# Patient Record
Sex: FEMALE | Race: WHITE
[De-identification: names, ages, dates, MRNs, and addresses within clinical notes are randomized per-mention and may not be internally consistent; named-entity substitution may affect disease eponyms.]

---

## 2018-05-18 ENCOUNTER — HOSPITAL ENCOUNTER (EMERGENCY)
Dept: HOSPITAL 97 - ER | Age: 53
Discharge: HOME | End: 2018-05-18
Payer: COMMERCIAL

## 2018-05-18 DIAGNOSIS — Z88.5: ICD-10-CM

## 2018-05-18 DIAGNOSIS — Z87.820: ICD-10-CM

## 2018-05-18 DIAGNOSIS — R51: Primary | ICD-10-CM

## 2018-05-18 LAB
BUN BLD-MCNC: 16 MG/DL (ref 6–20)
GLUCOSE SERPLBLD-MCNC: 94 MG/DL (ref 65–120)
HCT VFR BLD CALC: 43.5 % (ref 36–45)
LYMPHOCYTES # SPEC AUTO: 1.5 K/UL (ref 0.7–4.9)
MCH RBC QN AUTO: 27.9 PG (ref 27–35)
MCV RBC: 85.3 FL (ref 80–100)
PMV BLD: 7.5 FL (ref 7.6–11.3)
POTASSIUM SERPL-SCNC: 4.1 MEQ/L (ref 3.6–5)
RBC # BLD: 5.11 M/UL (ref 3.86–4.86)

## 2018-05-18 PROCEDURE — 70496 CT ANGIOGRAPHY HEAD: CPT

## 2018-05-18 PROCEDURE — 80048 BASIC METABOLIC PNL TOTAL CA: CPT

## 2018-05-18 PROCEDURE — 70450 CT HEAD/BRAIN W/O DYE: CPT

## 2018-05-18 PROCEDURE — 99284 EMERGENCY DEPT VISIT MOD MDM: CPT

## 2018-05-18 PROCEDURE — 36415 COLL VENOUS BLD VENIPUNCTURE: CPT

## 2018-05-18 PROCEDURE — 72125 CT NECK SPINE W/O DYE: CPT

## 2018-05-18 PROCEDURE — 85025 COMPLETE CBC W/AUTO DIFF WBC: CPT

## 2018-05-18 PROCEDURE — 81003 URINALYSIS AUTO W/O SCOPE: CPT

## 2018-05-18 NOTE — RAD REPORT
EXAM DESCRIPTION:  CT - [id number] - 5/18/2018 10:27 am

 

CLINICAL HISTORY:  Trauma, head and neck injury.

 

COMPARISON:  None.

 

TECHNIQUE:  Axial 5 mm thick images of the head were obtained.

 

Axial 2 mm thick images of the cervical spine were obtained with sagittal and coronal reconstruction 
images generated and reviewed.

 

All CT scans are performed using dose optimization technique as appropriate and may include automated
 exposure control or mA/KV adjustment according to patient size.

 

FINDINGS:  CT HEAD WITHOUT CONTRAST:

 

No acute hemorrhage, hydrocephalus or extra-axial collection is identified.No areas of brain edema or
 midline shift.

 

The paranasal sinuses and mastoids are clear.The calvarium is intact.

 

CT CERVICAL SPINE WITHOUT CONTRAST:

 

No fracture or subluxation.Mild lower cervical spondylosis.No prevertebral soft tissues swelling is i
dentified.

 

IMPRESSION:  No acute intracranial or cervical spine findings.

## 2018-05-18 NOTE — ER
Nurse's Notes                                                                                     

 [facility]                                                                

Name: Caitie Nj                                                                                

Age: 52 yrs                                                                                       

Sex: Female                                                                                       

: 1965                                                                                   

MRN: [id number]                                                                                   

Arrival Date: 2018                                                                          

Time: 09:39                                                                                       

Account#: [id number]                                                                            

Bed 17                                                                                            

Private MD:                                                                                       

Diagnosis: Headache                                                                               

                                                                                                  

Presentation:                                                                                     

                                                                                             

09:56 Presenting complaint: Patient states: "I had a traumatic brain injury back in  and  aa5 

      I had bleeding in the brain but this Wednesday I hit my head with a metal thing and I       

      am worried about bleeding again". Pt denies LOC. Pt c/o head and neck pain. Transition      

      of care: patient was not received from another setting of care. Onset of symptoms was       

      May 2018. Initial Sepsis Screen: Does the patient meet any 2 criteria? No. Patient's        

      initial sepsis screen is negative. Does the patient have a suspected source of              

      infection? No. Patient's initial sepsis screen is negative. Care prior to arrival: None.    

09:56 Method Of Arrival: Ambulatory                                                           aa 

09:56 Acuity: SANTOSH 4                                                                           aa5 

                                                                                                  

OB/GYN:                                                                                           

10:00 LMP 2018                                                                              aa5 

                                                                                                  

Historical:                                                                                       

- Allergies:                                                                                      

10:00 Demerol;                                                                                aa5 

- Home Meds:                                                                                      

10:00 hydrocodone-acetaminophen 7.5-325 mg Oral tab every 4-6 hours [Active]; Cyclobenzaprine aa5 

      Oral [Active]; Zoloft Oral [Active]; Trazodone Oral [Active];                               

- PMHx:                                                                                           

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 

- PSHx:                                                                                           

10:00 back; jesi knees; Appendectomy; Cholecystectomy;                                         aa5 

                                                                                                  

- Immunization history:: Adult Immunizations up to date.                                          

- Social history:: Smoking status: Patient/guardian denies using tobacco.                         

                                                                                                  

                                                                                                  

Screenin:20 Abuse screen: Denies threats or abuse. Denies injuries from another. Nutritional        hb  

      screening: No deficits noted. Tuberculosis screening: No symptoms or risk factors           

      identified. Fall Risk None identified.                                                      

                                                                                                  

Assessment:                                                                                       

11:20 General: Appears in no apparent distress. Behavior is calm, cooperative. Pain: Pain     hb  

      currently is 3 out of 10 on a pain scale. Neuro: Level of Consciousness is awake,           

      alert, obeys commands, Oriented to person, place, time, situation. Cardiovascular:          

      Capillary refill < 3 seconds Patient's skin is warm and dry. Respiratory: Airway is         

      patent Trachea midline Respiratory effort is even, unlabored, Respiratory pattern is        

      regular, symmetrical. GI: No signs and/or symptoms were reported involving the              

      gastrointestinal system. : No signs and/or symptoms were reported regarding the           

      genitourinary system. EENT: No signs and/or symptoms were reported regarding the EENT       

      system. Derm: No signs and/or symptoms reported regarding the dermatologic system. Skin     

      is pink, warm \T\ dry. Musculoskeletal: No signs and/or symptoms reported regarding the     

      musculoskeletal system.                                                                     

12:00 Reassessment: Patient appears in no apparent distress at this time. No changes from     hb  

      previously documented assessment. Patient and/or family updated on plan of care and         

      expected duration. Pain level reassessed. Patient is alert, oriented x 3, equal             

      unlabored respirations, skin warm/dry/pink.                                                 

13:00 Reassessment: Patient appears in no apparent distress at this time. No changes from     hb  

      previously documented assessment. Patient and/or family updated on plan of care and         

      expected duration. Pain level reassessed. Patient is alert, oriented x 3, equal             

      unlabored respirations, skin warm/dry/pink.                                                 

                                                                                                  

Vital Signs:                                                                                      

10:00  / 100; Pulse 88; Resp 18 S; Temp 98.2(TE); Pulse Ox 100% on R/A; Weight 68.95 kg aa5 

      (R); Height 5 ft. 4 in. (162.56 cm) (R); Pain 7/10;                                         

10:47  / 95; Pulse 78; Resp 16; Pulse Ox 100% ;                                         mh5 

11:30  / 100; Pulse 69; Resp 16; Pulse Ox 100% on R/A;                                  mh5 

12:22  / 98; Pulse 79; Resp 15; Pulse Ox 97% on R/A;                                    mh5 

10:00 Body Mass Index 26.09 (68.95 kg, 162.56 cm)                                             aa5 

                                                                                                  

Dayanara Coma Score:                                                                               

11:26 Eye Response: spontaneous(4). Verbal Response: oriented(5). Motor Response: obeys       snw 

      commands(6). Total: 15.                                                                     

13:01 Eye Response: spontaneous(4). Verbal Response: oriented(5). Motor Response: obeys       snw 

      commands(6). Total: 15.                                                                     

                                                                                                  

ED Course:                                                                                        

09:39 Patient arrived in ED.                                                                  sb2 

09:58 Triage completed.                                                                       aa5 

09:58 Arm band placed on.                                                                     aa5 

10:26 CT completed. Patient tolerated procedure well. Patient moved to CT via wheelchair.     sj  

      Patient moved back from CT.                                                                 

10:27 CT Head C Spine In Process Unspecified.                                                 EDMS

10:32 [facility]                                                        snw 

10:32 Alejandro Lynch MD is Attending Physician.                                              snw 

10:37 Sandhya Gant, RN is Primary Nurse.                                                   hb  

11:20 Patient has correct armband on for positive identification. Bed in low position. Call   hb  

      light in reach. Side rails up X 1.                                                          

11:33 Inserted saline lock: 20 gauge in right antecubital area, using aseptic technique.      hb  

      Blood collected.                                                                            

12:05 Head angio In Process Unspecified.                                                      EDMS

12:05 CT completed. Patient tolerated procedure well. Patient moved to CT via wheelchair.     sj  

      Patient moved to CT Patient moved back from CT.                                             

13:01 No provider procedures requiring assistance completed. IV discontinued, intact,         hb  

      bleeding controlled, No redness/swelling at site. Pressure dressing applied.                

                                                                                                  

Administered Medications:                                                                         

No medications were administered                                                                  

                                                                                                  

                                                                                                  

Outcome:                                                                                          

12:47 Discharge ordered by MD.                                                                snw 

13:01 Discharged to home ambulatory, with significant other.                                  hb  

13:01 Condition: stable                                                                           

13:01 Discharge instructions given to patient, Instructed on discharge instructions, follow       

      up and referral plans. medication usage, Demonstrated understanding of instructions,        

      follow-up care, medications, Prescriptions given X 2.                                       

13:01 Patient left the ED.                                                                    hb  

                                                                                                  

Signatures:                                                                                       

Dispatcher [employer]                           EDMS                                                 

Faith Simmons FNP-C FNP-Ingrid                                                  

Yolanda Souza Audri, RN RN   [facility]                                                  

Sandhya Gant RN RN hb Martinez, Maria                              [facility]                                                  

Sherice Escalona                               2                                                  

                                                                                                  

Corrections: (The following items were deleted from the chart)                                    

10: 09:56 Presenting complaint: Patient states: "I had a traumatic brain injury back in     2009 and I had bleeding in the brain but this Wednesday I hit my head with a metal          

      thing and I am worried about bleeding again". Pt denies LOC. [facility]                            

10: 09:56 Acuity: SANTOSH 3 aa5                                                                 aa5 

                                                                                                  

**************************************************************************************************

## 2018-05-18 NOTE — EDPHYS
Physician Documentation                                                                           

 [facility]                                                                

Name: Caitie Nj                                                                                

Age: 52 yrs                                                                                       

Sex: Female                                                                                       

: 1965                                                                                   

MRN: [id number]                                                                                   

Arrival Date: 2018                                                                          

Time: 09:39                                                                                       

Account#: [id number]                                                                            

Bed 17                                                                                            

Private MD:                                                                                       

ED Physician Alejandro Lynch                                                                       

HPI:                                                                                              

                                                                                             

11:26 This 52 yrs old  Female presents to ER via Ambulatory with complaints of PAIN  snw 

      FROM PREVIOUS HEAD INJURY.                                                                  

11:26 The patient or guardian reports pain, tenderness. The complaints affect the left side   snw 

      of the back of head. Context of injury: resulted from a direct blow, a heavy object.        

      Onset: The symptoms/episode began/occurred gradually, and became persistent. Associated     

      signs and symptoms: Loss of consciousness: This patient did not experience any loss of      

      consciousness. Pertinent positives: water flow sensation to left occipital area post a      

      popping sensation. Severity of symptoms: At their worst the symptoms were moderate. The     

      patient has not experienced similar symptoms in the past. The patient has not recently      

      seen a physician. TBI .                                                                 

                                                                                                  

OB/GYN:                                                                                           

10:00 LMP 2018                                                                              aa5 

                                                                                                  

Historical:                                                                                       

- Allergies:                                                                                      

10:00 Demerol;                                                                                aa5 

- Home Meds:                                                                                      

10:00 hydrocodone-acetaminophen 7.5-325 mg Oral tab every 4-6 hours [Active]; Cyclobenzaprine aa5 

      Oral [Active]; Zoloft Oral [Active]; Trazodone Oral [Active];                               

- PMHx:                                                                                           

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 

- PSHx:                                                                                           

10:00 back; jesi knees; Appendectomy; Cholecystectomy;                                         aa5 

                                                                                                  

- Immunization history:: Adult Immunizations up to date.                                          

- Social history:: Smoking status: Patient/guardian denies using tobacco.                         

                                                                                                  

                                                                                                  

ROS:                                                                                              

11:23 Constitutional: Negative for fever, chills, and weight loss, Eyes: Negative for injury, snw 

      pain, redness, and discharge, ENT: Negative for injury, pain, and discharge, Neck:          

      Negative for injury, pain, and swelling, Cardiovascular: Negative for chest pain,           

      palpitations, and edema, Respiratory: Negative for shortness of breath, cough,              

      wheezing, and pleuritic chest pain, Abdomen/GI: Negative for abdominal pain, nausea,        

      vomiting, diarrhea, and constipation, Back: Negative for injury and pain, : Negative      

      for injury, bleeding, discharge, and swelling, MS/Extremity: Negative for injury and        

      deformity, Skin: Negative for injury, rash, and discoloration.                              

11:23 Neuro: Positive for headache, pt states she feels a running water sensation at              

      intervals to left occipital area.                                                           

                                                                                                  

Exam:                                                                                             

11:23 Constitutional:  This is a well developed, well nourished patient who is awake, alert,  snw 

      and in no acute distress. Head/Face:  Normocephalic, atraumatic. Eyes:  Pupils equal        

      round and reactive to light, extra-ocular motions intact.  Lids and lashes normal.          

      Conjunctiva and sclera are non-icteric and not injected.  Cornea within normal limits.      

      Periorbital areas with no swelling, redness, or edema. ENT:  Nares patent. No nasal         

      discharge, no septal abnormalities noted.  Tympanic membranes are normal and external       

      auditory canals are clear.  Oropharynx with no redness, swelling, or masses, exudates,      

      or evidence of obstruction, uvula midline.  Mucous membranes moist. Neck:  Trachea          

      midline, no thyromegaly or masses palpated, and no cervical lymphadenopathy.  Supple,       

      full range of motion without nuchal rigidity, or vertebral point tenderness.  No            

      Meningismus. Chest/axilla:  Normal chest wall appearance and motion.  Nontender with no     

      deformity.  No lesions are appreciated. Cardiovascular:  Regular rate and rhythm with a     

      normal S1 and S2.  No gallops, murmurs, or rubs.  Normal PMI, no JVD.  No pulse             

      deficits. Respiratory:  Lungs have equal breath sounds bilaterally, clear to                

      auscultation and percussion.  No rales, rhonchi or wheezes noted.  No increased work of     

      breathing, no retractions or nasal flaring. Abdomen/GI:  Soft, non-tender, with normal      

      bowel sounds.  No distension or tympany.  No guarding or rebound.  No evidence of           

      tenderness throughout. Back:  No spinal tenderness.  No costovertebral tenderness.          

      Full range of motion. Skin:  Warm, dry with normal turgor.  Normal color with no            

      rashes, no lesions, and no evidence of cellulitis. MS/ Extremity:  Pulses equal, no         

      cyanosis.  Neurovascular intact.  Full, normal range of motion. Neuro:  Awake and           

      alert, GCS 15, oriented to person, place, time, and situation.  Cranial nerves II-XII       

      grossly intact.  Motor strength 5/5 in all extremities.  Sensory grossly intact.            

      Cerebellar exam normal.  Normal gait.                                                       

                                                                                                  

Vital Signs:                                                                                      

10:00  / 100; Pulse 88; Resp 18 S; Temp 98.2(TE); Pulse Ox 100% on R/A; Weight 68.95 kg aa5 

      (R); Height 5 ft. 4 in. (162.56 cm) (R); Pain 7/10;                                         

10:47  / 95; Pulse 78; Resp 16; Pulse Ox 100% ;                                         mh5 

11:30  / 100; Pulse 69; Resp 16; Pulse Ox 100% on R/A;                                  mh5 

12:22  / 98; Pulse 79; Resp 15; Pulse Ox 97% on R/A;                                    mh5 

10:00 Body Mass Index 26.09 (68.95 kg, 162.56 cm)                                             aa5 

                                                                                                  

[place]anara Coma Score:                                                                               

11:26 Eye Response: spontaneous(4). Verbal Response: oriented(5). Motor Response: obeys       snw 

      commands(6). Total: 15.                                                                     

13:01 Eye Response: spontaneous(4). Verbal Response: oriented(5). Motor Response: obeys       snw 

      commands(6). Total: 15.                                                                     

                                                                                                  

MDM:                                                                                              

10:44 Patient medically screened.                                                             snw 

13:01 Data reviewed: vital signs, nurses notes. Data interpreted: Pulse oximetry: on room air snw 

      is 97 %. Interpretation: normal. Counseling: I had a detailed discussion with the           

      patient and/or guardian regarding: the historical points, exam findings, and any            

      diagnostic results supporting the discharge/admit diagnosis, the presence of at least       

      one elevated blood pressure reading (>120/80) during this emergency department visit,       

      the need for outpatient follow up, to return to the emergency department if symptoms        

      worsen or persist or if there are any questions or concerns that arise at home. Special     

      discussion: Based on the patient's history, exam and DX evaluation, there is no             

      indication for emergent intervention or inpatient TX. It is understood by the               

      patient/guardian that if the SXs persist or worsen they need to return immediately for      

      re-evaluation. Based on the history and exam findings, there is no indication for           

      further emergent testing or inpatient evaluation. I discussed with the patient/guardian     

      the need to see the primary care provider for further evaluation of the symptoms.           

                                                                                                  

                                                                                             

11:11 Order name: CBC with Diff; Complete Time: 11:46                                         snw 

                                                                                             

11:11 Order name: Chem 7; Complete Time: 11:57                                                [facility] 

                                                                                             

10:11 Order name: CT Head C Spine; Complete Time: 10:44                                       [facility] 

                                                                                             

11:12 Order name: Head angio; Complete Time: 12:46                                            [facility]

                                                                                             

12:56 Order name: Urine Dipstick--Ancillary (enter results)                                   bd  

                                                                                                  

Administered Medications:                                                                         

No medications were administered                                                                  

                                                                                                  

                                                                                                  

Disposition:                                                                                      

18:40 Co-signature as Attending Physician, Alejandro Lynch MD.                                    

                                                                                                  

Disposition:                                                                                      

18 12:47 Discharged to Home. Impression: Headache.                                          

- Condition is Stable.                                                                            

- Discharge Instructions: General Headache Without Cause, Hypertension, Rehydration,              

  Adult.                                                                                          

- Prescriptions for Fiorinal 50- 325-40 mg Oral Capsule - take 1 capsule by ORAL route            

  every 6 hours As needed - not to exceed 6 capsules per day; 10 capsule. orphenadrine            

  citrate 100 mg Oral Tablet Sustained Release - take 1 tablet by ORAL route 2 times              

  per day As needed; 20 tablet.                                                                   

- Medication Reconciliation Form, Thank You Letter, Antibiotic Education, Prescription            

  Opioid Use form.                                                                                

- Follow up: Private Physician; When: 2 - 3 days; Reason: Recheck today's complaints,             

  Continuance of care, Re-evaluation by your physician. Follow up: Emergency                      

  Department; When: As needed; Reason: Worsening of condition.                                    

                                                                                                  

                                                                                                  

                                                                                                  

Signatures:                                                                                       

Dispatcher [employer]                           [facility]                                                 

Faith Simmons, FNP-C                 FNP-Csnw                                                  

Rhianna Fontana RN                     RN   aa5                                                  

Sandhya Gant RN                     RN   hb                                                   

Alejandro Lynch MD MD                                                      

                                                                                                  

Corrections: (The following items were deleted from the chart)                                    

13:01 12:47 2018 12:47 Discharged to Home. Impression: Headache. Condition is Stable.   hb  

      Forms are Medication Reconciliation Form, Thank You Letter, Antibiotic Education,           

      Prescription Opioid Use. Follow up: Private Physician; When: 2 - 3 days; Reason:            

      Recheck today's complaints, Continuance of care, Re-evaluation by your physician.           

      Follow up: Emergency Department; When: As needed; Reason: Worsening of condition. snw       

                                                                                                  

**************************************************************************************************

## 2018-06-05 ENCOUNTER — HOSPITAL ENCOUNTER (EMERGENCY)
Dept: HOSPITAL 97 - ER | Age: 53
Discharge: HOME | End: 2018-06-05
Payer: SELF-PAY
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
## 2018-06-05 DIAGNOSIS — S93.601A: Primary | ICD-10-CM

## 2018-06-05 DIAGNOSIS — Z88.8: ICD-10-CM

## 2018-06-05 PROCEDURE — 99283 EMERGENCY DEPT VISIT LOW MDM: CPT

## 2018-06-05 NOTE — EDPHYS
Physician Documentation                                                                           

 [facility]                                                                

Name: Caitie Nj                                                                                

Age: 53 yrs                                                                                       

Sex: Female                                                                                       

: 1965                                                                                   

MRN: [id number]                                                                                   

Arrival Date: 2018                                                                          

Time: 03:26                                                                                       

Account#: [id number]                                                                            

Bed 14                                                                                            

Private MD: Aldo Matias ED Physician Chepe Leal                                                                     

HPI:                                                                                              

                                                                                             

06:56 This 53 yrs old  Female presents to ER via Wheelchair with complaints of Foot  tw4 

      Injury.                                                                                     

06:56 The patient presents with a contusion, an injury.                                       tw4 

06:57 The complaints affect the right foot. Context: The problem was sustained at home.       tw4 

      Modifying factors: The symptoms are alleviated by nothing, the symptoms are aggravated      

      by nothing. Associated signs and symptoms: The patient has no apparent associated signs     

      or symptoms. Severity of symptoms: At their worst the symptoms were. The patient has        

      not experienced similar symptoms in the past.                                               

                                                                                                  

OB/GYN:                                                                                           

03:33 LMP N/A - Post-menopause                                                                fc  

                                                                                                  

Historical:                                                                                       

- Allergies:                                                                                      

03:33 Demerol;                                                                                fc  

- Home Meds:                                                                                      

03:33 hydrocodone-acetaminophen 7.5-325 mg Oral tab every 4-6 hours [Active]; Zoloft 50 mg    fc  

      oral tab 1 tab nightly [Active]; cyclobenzaprine 10 mg oral tab as needed [Active];         

      duloxetine 30 mg 1 tab in am and mid day then 60 mg at night oral cpDR [Active];            

- PMHx:                                                                                           

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  

- PSHx:                                                                                           

03:33 Knee surgery; Cholecystectomy; Appendectomy; back surg; ectopic pregnancy;              fc  

                                                                                                  

- Immunization history:: Last tetanus immunization: up to date.                                   

- Social history:: Smoking status: Patient/guardian denies using tobacco.                         

- Ebola Screening: : Patient negative for fever greater than or equal to 101.5 degrees            

  Fahrenheit, and additional compatible Ebola Virus Disease symptoms Patient denies               

  exposure to infectious person Patient denies travel to an Ebola-affected area in the            

  21 days before illness onset.                                                                   

                                                                                                  

                                                                                                  

ROS:                                                                                              

06:57 MS/extremity: Positive for injury or acute deformity, decreased range of motion, pain.  tw4 

06:57 Cardiovascular: Negative for chest pain, palpitations, and edema, Respiratory: Negative     

      for shortness of breath, cough, wheezing, and pleuritic chest pain, Abdomen/GI:             

      Negative for abdominal pain, nausea, vomiting, diarrhea, and constipation, Back:            

      Negative for injury and pain.                                                               

06:57 MS/extremity: Positive for injury or acute deformity, contusion, decreased range of         

      motion, pain, Negative for                                                                  

                                                                                                  

Exam:                                                                                             

06:57 Constitutional:  This is a well developed, well nourished patient who is awake, alert,  tw4 

      and in no acute distress. Head/Face:  Normocephalic, atraumatic. Cardiovascular:            

      Regular rate and rhythm with a normal S1 and S2.  No gallops, murmurs, or rubs.  Normal     

      PMI, no JVD.  No pulse deficits. Respiratory:  Lungs have equal breath sounds               

      bilaterally, clear to auscultation and percussion.  No rales, rhonchi or wheezes noted.     

       No increased work of breathing, no retractions or nasal flaring.                           

06:57 Musculoskeletal/extremity: Extremities: noted in the dorsum of right foot: ROM: limited     

      active range of motion, limited passive range of motion, in the dorsum of right foot,       

      right second toe and Right first toenail.                                                   

                                                                                                  

Vital Signs:                                                                                      

03:33  / 97; Pulse 95; Resp 18; Temp 97.9(O); Pulse Ox 99% on R/A; Weight 68.49 kg (R); fc  

      Height 5 ft. 4 in. (162.56 cm) (R); Pain 8/10;                                              

03:33 Body Mass Index 25.92 (68.49 kg, 162.56 cm)                                             fc  

                                                                                                  

MDM:                                                                                              

03:47 Patient medically screened.                                                             tw4 

06:57 Data reviewed: vital signs, nurses notes. Data interpreted: Cardiac monitor: rhythm is  tw4 

      normal sinus rhythm, Pulse oximetry: Interpretation: normal. Counseling: I had a            

      detailed discussion with the patient and/or guardian regarding: the historical points,      

      exam findings, and any diagnostic results supporting the discharge/admit diagnosis.         

      Medication response: NORCO. Response to treatment: the patient's symptoms have markedly     

      improved after treatment, and as a result, I will discharge patient. Special                

      discussion: I discussed with the patient/guardian in detail that at this point there is     

      no indication for admission to the hospital. It is understood, however, that if the         

      symptoms persist or worsen the patient needs to return immediately for re-evaluation.       

                                                                                                  

                                                                                             

03:39 Order name: Foot Right 3 View XRAY; Complete Time: 07:59                                fc  

                                                                                                  

Administered Medications:                                                                         

05:00 Drug: Norco 5 mg-325 mg 1 tabs Route: PO;                                               aa1 

05:04 Follow up: Response: No adverse reaction; Medication administered at discharge.         aa1 

                                                                                                  

                                                                                                  

Disposition:                                                                                      

18 04:55 Discharged to Home. Impression: Other sprain of right foot.                        

- Condition is Stable.                                                                            

- Discharge Instructions: Foot Contusion, Foot Contusion, Easy-to-Read.                           

- Prescriptions for Ibuprofen 800 mg Oral Tablet - take 1 tablet by ORAL route every 8            

  hours As needed take with food; 30 tablet. Tylenol- Codeine #3 300-30 mg Oral Tablet            

  - take 2 tablet by ORAL route every 6 hours As needed; 6 tablet.                                

- Medication Reconciliation Form, Thank You Letter, Antibiotic Education, Prescription            

  Opioid Use form.                                                                                

- Follow up: Aldo Matias MD; When: As needed; Reason: If symptoms return, Recheck              

  today's complaints, Continuance of care, Re-evaluation by your physician.                       

- Problem is new.                                                                                 

- Symptoms have improved.                                                                         

                                                                                                  

                                                                                                  

                                                                                                  

Addendum:                                                                                         

2018                                                                                        

     08:11 Addendum: D/w patient and she indicated that in follow-up her PCP noted the fracture    k
dr

           and that she had been sent for an MRI. Current concern is for a possible Lis Franc     

           fracture. She was not given pain medication, splinting or wrap support. The patient is 

           a physical therapist. I indicated that we would defer the physician portion of the     

           bill. The conversation was cordial and she seemed to be satisfied with the call-back   

           and proposed outcome/remediation.                                                      

2018                                                                                        

     15:55 Addendum: Lennox avelar dr

                                                                                                  

Signatures:                                                                                       

Dispatcher [employer]                           [facility]                                                 

Christie Longoria RN                        RN   aa1                                                  

Cornelio Mensah MD MD   [facility]                                                  

Jennifer Salas RN                   RN                                                      

Chepe Leal MD MD   tw4                                                  

                                                                                                  

Corrections: (The following items were deleted from the chart)                                    

                                                                                             

05:09 04:55 2018 04:55 Discharged to Home. Impression: Other sprain of right foot.      aa1 

      Condition is Stable. Forms are Medication Reconciliation Form, Thank You Letter,            

      Antibiotic Education, Prescription Opioid Use. Follow up: Aldo Matias; When: As            

      needed; Reason: If symptoms return, Recheck today's complaints, Continuance of care,        

      Re-evaluation by your physician. Problem is new. Symptoms have improved. tw4                

                                                                                                  

**************************************************************************************************

## 2018-06-05 NOTE — ER
Nurse's Notes                                                                                     

 [facility]                                                                

Name: Caitie Nj                                                                                

Age: 53 yrs                                                                                       

Sex: Female                                                                                       

: 1965                                                                                   

MRN: [id number]                                                                                   

Arrival Date: 2018                                                                          

Time: 03:26                                                                                       

Account#: [id number]                                                                            

Bed 14                                                                                            

Private MD: Aldo Matias                                                                         

Diagnosis: Other sprain of right foot                                                             

                                                                                                  

Presentation:                                                                                     

                                                                                             

03:29 Presenting complaint: Patient states: that she tripped over the dog last night at 2030. fc  

      Now daisy is red and swollen, very tender to touch. Transition of care: patient was not      

      received from another setting of care. Onset of symptoms was 2018 at 20:30.        

      Risk Assessment: Do you want to hurt yourself or someone else? Patient reports no           

      desire to harm self or others. Initial Sepsis Screen: Does the patient meet any 2           

      criteria? HR > 90 bpm. Yes Does the patient have a suspected source of infection? No.       

      Patient's initial sepsis screen is negative. Care prior to arrival: Medication(s)           

      given: Hydrocodone at 2130.                                                                 

03:29 Method Of Arrival: Wheelchair                                                           fc  

03:29 Acuity: SANTOSH 4                                                                           fc  

                                                                                                  

OB/GYN:                                                                                           

03:33 LMP N/A - Post-menopause                                                                fc  

                                                                                                  

Historical:                                                                                       

- Allergies:                                                                                      

03:33 Demerol;                                                                                fc  

- Home Meds:                                                                                      

03:33 hydrocodone-acetaminophen 7.5-325 mg Oral tab every 4-6 hours [Active]; Zoloft 50 mg    fc  

      oral tab 1 tab nightly [Active]; cyclobenzaprine 10 mg oral tab as needed [Active];         

      duloxetine 30 mg 1 tab in am and mid day then 60 mg at night oral cpDR [Active];            

- PMHx:                                                                                           

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  

- PSHx:                                                                                           

03:33 Knee surgery; Cholecystectomy; Appendectomy; back surg; ectopic pregnancy;              fc  

                                                                                                  

- Immunization history:: Last tetanus immunization: up to date.                                   

- Social history:: Smoking status: Patient/guardian denies using tobacco.                         

- Ebola Screening: : Patient negative for fever greater than or equal to 101.5 degrees            

  Fahrenheit, and additional compatible Ebola Virus Disease symptoms Patient denies               

  exposure to infectious person Patient denies travel to an Ebola-affected area in the            

  21 days before illness onset.                                                                   

                                                                                                  

                                                                                                  

Screenin:35 Abuse screen: Denies threats or abuse. Nutritional screening: No deficits noted.          

      Tuberculosis screening: No symptoms or risk factors identified.                             

04:00 Fall Risk None identified.                                                              aa1 

                                                                                                  

Assessment:                                                                                       

04:00 General: Appears in no apparent distress. comfortable, Behavior is calm, cooperative,   aa1 

      appropriate for age. Pain: Complains of pain in dorsum of right foot. Neuro: Level of       

      Consciousness is awake, alert, obeys commands, Oriented to person, place, time,             

      situation. Respiratory: Airway is patent Respiratory effort is even, unlabored,             

      Respiratory pattern is regular, symmetrical. GI: No signs and/or symptoms were reported     

      involving the gastrointestinal system. : No signs and/or symptoms were reported           

      regarding the genitourinary system. EENT: No signs and/or symptoms were reported            

      regarding the EENT system. Derm: Skin is intact, is healthy with good turgor, Skin is       

      pink, warm \T\ dry. Musculoskeletal: Circulation, motion, and sensation intact. Capillary   

      refill < 3 seconds, Range of motion: intact in all extremities.                             

05:05 Reassessment: Patient appears in no apparent distress at this time. Patient is alert,   aa1 

      oriented x 3, equal unlabored respirations, skin warm/dry/pink. Discussed d/c \T\ f/u       

      instructions with pt; denies questions or concerns at this time.                            

                                                                                                  

Vital Signs:                                                                                      

03:33  / 97; Pulse 95; Resp 18; Temp 97.9(O); Pulse Ox 99% on R/A; Weight 68.49 kg (R); fc  

      Height 5 ft. 4 in. (162.56 cm) (R); Pain 8/10;                                              

03:33 Body Mass Index 25.92 (68.49 kg, 162.56 cm)                                               

                                                                                                  

ED Course:                                                                                        

03:26 Patient arrived in ED.                                                                  ds1 

03:26 Aldo Matais MD is Private Physician.                                                 ds1 

03:31 Triage completed.                                                                       fc  

03:33 Arm band placed on right wrist. Patient placed in an exam room, on a stretcher.         fc  

03:35 Patient has correct armband on for positive identification. Bed in low position. Call     

      light in reach.                                                                             

03:47 Chepe Leal MD is Attending Physician.                                            tw4 

04:00 X-ray completed. Portable x-ray completed in exam room. Patient tolerated procedure     kw  

      well.                                                                                       

04:01 Foot Right 3 View XRAY In Process Unspecified.                                          EDMS

04:55 Aldo Matias MD is Referral Physician.                                                tw4 

04:57 Christie Longoria, RN is Primary Nurse.                                                      aa1 

05:05 No provider procedures requiring assistance completed. Patient did not have IV access   aa1 

      during this emergency room visit.                                                           

                                                                                                  

Administered Medications:                                                                         

05:00 Drug: Norco 5 mg-325 mg 1 tabs Route: PO;                                               aa1 

05:04 Follow up: Response: No adverse reaction; Medication administered at discharge.         aa1 

                                                                                                  

                                                                                                  

Outcome:                                                                                          

04:55 Discharge ordered by MD.                                                                tw4 

05:05 Discharged to home via wheelchair, with significant other.                              aa1 

05:05 Condition: good                                                                             

05:05 Discharge instructions given to patient, significant other, Instructed on discharge         

      instructions, follow up and referral plans. medication usage, Demonstrated                  

      understanding of instructions, follow-up care, medications, Prescriptions given X 2.        

05:09 Patient left the ED.                                                                    aa1 

                                                                                                  

Signatures:                                                                                       

Dispatcher [employer]                           EDMS                                                 

Christie Longoria, ABDOULAYE STANFORD   aa1                                                  

Chretien, Jennifer, RN                   RN   fc                                                   

Sorto, Bela                                ds1                                                  

Keya Flores Terrence, MD MD   tw4                                                  

                                                                                                  

**************************************************************************************************

## 2018-06-05 NOTE — RAD REPORT
EXAM DESCRIPTION:  RAD - Foot Right 3 View - 6/5/2018 4:01 am

 

CLINICAL HISTORY:  Pain to foot

 

COMPARISON:  None.

 

FINDINGS:  Soft tissue swelling is seen along the dorsum of the foot at the level of the tarsal metat
arsal articulations. There is subtle lucency seen at the base of the second metatarsal medially this 
may be a insignificant, however a subtle fracture could also have this appearance. Advise followup MR
 imaging if pain persists or progresses to better assess this region.

## 2023-03-18 ENCOUNTER — HOSPITAL ENCOUNTER (EMERGENCY)
Dept: HOSPITAL 97 - ER | Age: 58
Discharge: HOME | End: 2023-03-18
Payer: COMMERCIAL

## 2023-03-18 VITALS — TEMPERATURE: 98.2 F | DIASTOLIC BLOOD PRESSURE: 58 MMHG | SYSTOLIC BLOOD PRESSURE: 99 MMHG

## 2023-03-18 VITALS — OXYGEN SATURATION: 95 %

## 2023-03-18 DIAGNOSIS — Z20.822: ICD-10-CM

## 2023-03-18 DIAGNOSIS — Z88.5: ICD-10-CM

## 2023-03-18 DIAGNOSIS — L08.9: ICD-10-CM

## 2023-03-18 DIAGNOSIS — J02.0: Primary | ICD-10-CM

## 2023-03-18 LAB — SARS-COV-2 RNA RESP QL NAA+PROBE: NEGATIVE

## 2023-03-18 PROCEDURE — 0240U: CPT

## 2023-03-18 PROCEDURE — 87081 CULTURE SCREEN ONLY: CPT

## 2023-03-18 PROCEDURE — 99283 EMERGENCY DEPT VISIT LOW MDM: CPT

## 2023-03-18 NOTE — EDPHYS
Physician Documentation                                                                           

 [facility]                                                                 

Name: Caitie Nj                                                                                

Age: 57 yrs                                                                                       

Sex: Female                                                                                       

: 1965                                                                                   

MRN: [id number]                                                                                   

Arrival Date: 2023                                                                          

Time: 13:42                                                                                       

Account#: [id number]                                                                            

Bed 12                                                                                            

Private MD: Aldo Matias                                                                         

ED Physician Hemanth Almanzar                                                                       

Historical:                                                                                       

- Allergies:                                                                                      

                                                                                             

13:52 Demerol;                                                                                ll1 

- PMHx:                                                                                           

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 

      Hypertensive disorder;                                                                      

- PSHx:                                                                                           

13:52 hip replacement; neurostimulator; Appendectomy;                                         ll1 

                                                                                                  

- Immunization history:: Client reports receiving the 2nd dose of the Covid vaccine.              

- Social history:: Smoking status: Patient denies any tobacco usage or history of.                

                                                                                                  

                                                                                                  

Vital Signs:                                                                                      

13:50  / 96; Pulse 102; Resp 17; Temp 99.8; Pulse Ox 95% on R/A; Weight 76.66 kg;       ll1 

      Height 5 ft. 4 in. ; Pain 9/10;                                                             

13:50 Body Mass Index 29.01 (76.66 kg, 162.56 cm)                                             ll1 

13:50 Pain Scale: Adult                                                                       ll1 

                                                                                                  

MDM:                                                                                              

13:54 Patient medically screened.                                                             cp  

                                                                                                  

                                                                                             

13:51 Order name: Strep                                                                       cp  

                                                                                             

13:51 Order name: COVID-19/FLU A+B                                                            cp  

                                                                                                  

Administered Medications:                                                                         

14:20 Drug: Viscous Lidocaine Mucous Membrane Liquid (4 %) 5 ml Route: Mucous Membrane;       ll1 

                                                                                                  

                                                                                                  

Disposition Summary:                                                                              

23 15:12                                                                                    

Discharge Ordered                                                                                 

      Location: Home                                                                          cp  

      Problem: new                                                                            cp  

      Symptoms: have improved                                                                 cp  

      Condition: Stable                                                                       cp  

      Diagnosis                                                                                   

        - Streptococcal pharyngitis                                                           cp  

        - Local infection of the skin and subcutaneous tissue, unspecified - chest            cp  

      Followup:                                                                               cp  

        - With: Private Physician                                                                  

        - When: 2 - 3 days                                                                         

        - Reason: Worsening of condition                                                           

      Forms:                                                                                      

        - Medication Reconciliation Form                                                      cp  

        - Thank You Letter                                                                    cp  

        - Antibiotic Education                                                                cp  

        - Prescription Opioid Use                                                             cp  

Signatures:                                                                                       

Dispatcher [employer]                           EDMS                                                 

Sulaiman Rangel PA PA cp Lewis, Lynsay, RN                       RN   ll1                                                  

                                                                                                  

**************************************************************************************************

## 2023-03-18 NOTE — ER
Nurse's Notes                                                                                     

 [facility]                                                                 

Name: Caitie Nj                                                                                

Age: 57 yrs                                                                                       

Sex: Female                                                                                       

: 1965                                                                                   

MRN: [id number]                                                                                   

Arrival Date: 2023                                                                          

Time: 13:42                                                                                       

Account#: [id number]                                                                            

Bed 12                                                                                            

Private MD: Aldo Matias                                                                         

Diagnosis: Streptococcal pharyngitis;Local infection of the skin and subcutaneous tissue,         

  unspecified-chest                                                                               

                                                                                                  

Presentation:                                                                                     

                                                                                             

13:50 Chief complaint: Patient states: Sore throat, not feeling well since Friday.            ll1 

      Coronavirus screen: Vaccine status: Patient reports receiving the 2nd dose of the covid     

      vaccine. Client denies travel out of the [place] in the last 14 days. congestion, cough        

      unrelated to allergies, fatigue, headache, sore throat, Client presents with at least       

      one sign or symptom that may indicate coronavirus-19. Standard/surgical mask placed on      

      the client. Ebola Screen: Patient denies travel to an Ebola-affected area in the 21         

      days before illness onset. Initial Sepsis Screen: Does the patient meet any 2 criteria?     

      No. Patient's initial sepsis screen is negative. Does the patient have a suspected          

      source of infection? Yes: Other: sore throat. Risk Assessment: Do you want to hurt          

      yourself or someone else? Patient reports no desire to harm self or others. Onset of        

      symptoms was 2023.                                                                

13:50 Method Of Arrival: Ambulatory                                                           1 

13:50 Acuity: SANTOSH 4                                                                           ll1 

                                                                                                  

Triage Assessment:                                                                                

13:50 General: Appears uncomfortable, ill, Behavior is calm, cooperative, appropriate for     1 

      age. Pain: Complains of pain in L side of throat Quality of pain is described as            

      aching. EENT: Throat is reddened Reports pain in L side of throat.                          

                                                                                                  

Historical:                                                                                       

- Allergies:                                                                                      

13:52 Demerol;                                                                                ll1 

- PMHx:                                                                                           

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 

      Hypertensive disorder;                                                                      

- PSHx:                                                                                           

13:52 hip replacement; neurostimulator; Appendectomy;                                         ll1 

                                                                                                  

- Immunization history:: Client reports receiving the 2nd dose of the Covid vaccine.              

- Social history:: Smoking status: Patient denies any tobacco usage or history of.                

                                                                                                  

                                                                                                  

Screenin:26 [facility] ED Fall Risk Assessment (Adult) Score/Fall Risk Level 0 - 2 = Low Risk         ll1 

      Oriented to surroundings, Maintained a safe environment, Educated pt \T\ family on fall     

      prevention, incl call for assistance when getting out of bed, Hourly rounding (assess       

      needs \T\ fall precautionary measures) done. Abuse screen: Denies threats or abuse.         

      Nutritional screening: No deficits noted. Tuberculosis screening: No symptoms or risk       

      factors identified.                                                                         

                                                                                                  

Assessment:                                                                                       

14:27 Respiratory: Airway is patent Respiratory effort is even, unlabored.                    ll1 

                                                                                                  

Vital Signs:                                                                                      

13:50  / 96; Pulse 102; Resp 17; Temp 99.8; Pulse Ox 95% on R/A; Weight 76.66 kg;       ll1 

      Height 5 ft. 4 in. ; Pain 9/10;                                                             

13:50 Body Mass Index 29.01 (76.66 kg, 162.56 cm)                                             ll1 

13:50 Pain Scale: Adult                                                                       ll1 

                                                                                                  

ED Course:                                                                                        

13:42 Patient arrived in ED.                                                                  mr  

13:42 Aldo Matias MD is Private Physician.                                                 mr  

13:43 [facility]                                                                cp  

13:43 Hemanth Almanzar MD is Attending Physician.                                              cp  

13:52 Triage completed.                                                                       ll1 

13:53 Arm band placed on Patient placed in an exam room, on a stretcher.                      ll1 

14:08 COVID-19/FLU A+B Sent.                                                                  rs5 

14:08 Strep Sent.                                                                             rs5 

14:27 Patient has correct armband on for positive identification. Bed in low position. Call   ll1 

      light in reach. Cardiac monitoring not applicable on this patient.                          

                                                                                                  

Administered Medications:                                                                         

14:20 Drug: Viscous Lidocaine Mucous Membrane Liquid (4 %) 5 ml Route: Mucous Membrane;       ll1 

                                                                                                  

                                                                                                  

Medication:                                                                                       

14:27 VIS not applicable for this client.                                                     ll1 

                                                                                                  

Outcome:                                                                                          

15:12 Discharge ordered by MD.                                                                cp  

                                                                                                  

Signatures:                                                                                       

Coreen Gibson                                 mr                                                   

Sulaiman Rangel PA                         PA   Benigno Roland, RN                       RN   ll1                                                  

Brandon Benavides                               rs5                                                  

                                                                                                  

**************************************************************************************************

## 2023-03-18 NOTE — XMS REPORT
Continuity of Care Document

                            Created on:2023



Patient:GAIL VELÁZQUEZ

Sex:Female

:1965

External Reference #:[id number]





Demographics







                          Address                   [address] [place] [address]

 

                          Home Phone                [phone]

 

                          Work Phone                [phone]

 

                          Mobile Phone              [phone]

 

                          Email Address             [email or web address]

 

                          Preferred Language        en

 

                          Marital Status            Unknown

 

                          Judaism Affiliation     Unknown

 

                          Race                      Unknown

 

                          Additional Race(s)        White

 

                          Ethnic Group              Not  or 









Author







                          Organization              [facility]

t

 

                          Address                   [address]



                                                    [place] [address]

 

                          Phone                     [phone]









Support







                Name            Relationship    Address         Phone

 

                Saeid Krishnamurthy   Friend          Unavailable     [phone]

 

                MELANIA KEANE    Unavailable     [address]        [phone]



                                                [place] [address] 









Care Team Providers







                    Name                Role                Phone

 

                    Aldo Matias Jr. Primary Care Physician [phone]

 

                    SHANT COLBY   Attending Clinician Unavailable

 

                    Capri Rosario PT   Attending Clinician Unavailable

 

                    JESUS YOUNG      Attending Clinician Unavailable

 

                    Jesus Corrales  Attending Clinician [phone]

 

                    Shant Colby MD Attending Clinician [phone]

 

                    Doctor Unassigned, No Name Attending Clinician Unavailable

 

                    Cathleen Damon RN Attending Clinician Unavailable

 

                    Pob[facility]   Attending Clinician Unavailable

 

                    Only, Adc Test      Attending Clinician Unavailable

 

                    JOSUE MARINO     Attending Clinician Unavailable

 

                    Josue Marino DO  Attending Clinician [phone]

 

                    Giselle Das         Attending Clinician Unavailable

 

                    SHANT COLBY   Admitting Clinician Unavailable

 

                    Shant Colby MD Admitting Clinician [phone]

 

                    JOSUE MARINO     Admitting Clinician Unavailable

 

                    Giselle Das         Admitting Clinician Unavailable









Payers







           Payer Name Policy Type Policy Number Effective Date Expiration Date DEE DEE ALBERTO            [id number] 2021            



           PLAN                             00:00:00              







Problems







       Condition Condition Condition Status Onset  Resolution Last   Treating Co

mments 

Source



       Name   Details Category        Date   Date   Treatment Clinician        



                                                 Date                 

 

       Primary Primary Disease Active                       Overview: Univ

ers



       osteoarthr osteoarthr               2-                        Formattin

 ity of



       itis of itis of               00:00:                      g of this [place]



       left hip left hip               00                          note   Medica

l



                                                               might be Branch



                                                               different 



                                                               from the 



                                                               original. 



                                                               Added  



                                                               automatic 



                                                               ally from 



                                                               request 



                                                               for    



                                                               surgery 



                                                               [id number] 







Allergies, Adverse Reactions, Alerts







       Allergy Allergy Status Severity Reaction(s) Onset  Inactive Treating Comm

ents 

Source



       Name   Type                        Date   Date   Clinician        

 

       Baylee Propensi Active        Hives                        Univer

s



       ne     ty to                       14                        ity of



              adverse                      00:00:                      [place]



              reaction                      00                          Medical



              s                                                       Branch

 

       MEPERIDI DRUG   Active        Hives                        Univers



       NE     INGREDI                                              ity of



                                          00:00:                      [place]



                                          00                          Medical



                                                                      Branch

 

       meperidi DA     Active U                                   [facility]



       ne                                                         [place]



                                          00:00:                      [facility]



                                          00                          are



                                                                      [facility]

 

       meperidi DA     Active U      UNKNOWN                       [facility]



       ne                                                         [place]



                                          00:00:                      Health



                                          00                          are



                                                                      [facility]







Social History







           Social Habit Start Date Stop Date  Quantity   Comments   Source

 

           Exposure to                       Not sure              [facility]

 [place]



           SARS-CoV-2 (event)                                             Med[facility]

l Branch

 

           Tobacco use and 2022 Never used            [facility]



           exposure   00:00:00   00:00:00                         Medical Branch

 

           Sex Assigned At 1965                       [facility]



           Birth      00:00:00   00:00:00                         Medical Branch









                Smoking Status  Start Date      Stop Date       Source

 

                Smoker, current status 2022 00:00:00                 [facility]



                unknown                                         Branch







Medications







       Ordered Filled Start  Stop   Current Ordering Indication Dosage Frequency

 Signature

                    Comments            Components          Source



     Medication Medication Date Date Medication? Clinician                (SIG) 

          



     Name Name                                                   

 

     rivaroxaban      2022- No        1480 10mg      Take 1           Uni

vers



     (XARELTO)                                tablet by           ity 

of



     10 mg      00:00: 05:59                          mouth           [place]



     tablet      00   :00                           daily for           Medical



                                                  28 days.           Branch



                                                  Indication           



                                                  s: deep           



                                                  vein           



                                                  thrombosis           



                                                  prevention           



                                                  in hip           



                                                  surgery           

 

     rivaroxaban       No        1480 10mg      Take 1           Uni

vers



     (XARELTO)                                tablet by           ity 

of



     10 mg      00:00: 05:59                          mouth           [place]



     tablet      00   :00                           daily for           Medical



                                                  28 days.           Branch



                                                  Indication           



                                                  s: deep           



                                                  vein           



                                                  thrombosis           



                                                  prevention           



                                                  in hip           



                                                  surgery           

 

     rivaroxaban      2022- No        1480 10mg      Take 1           Uni

vers



     (XARELTO)                                tablet by           ity 

of



     10 mg      00:00: 05:59                          mouth           [place]



     tablet      00   :00                           daily for           Medical



                                                  28 days.           Branch



                                                  Indication           



                                                  s: deep           



                                                  vein           



                                                  thrombosis           



                                                  prevention           



                                                  in hip           



                                                  surgery           

 

     rivaroxaban      2022- No        1480 10mg      Take 1           Uni

vers



     (XARELTO)      2-07 03-08                          tablet by           ity 

of



     10 mg      00:00: 05:59                          mouth           [place]



     tablet      00   :00                           daily for           Medical



                                                  28 days.           Branch



                                                  Indication           



                                                  s: deep           



                                                  vein           



                                                  thrombosis           



                                                  prevention           



                                                  in hip           



                                                  surgery           

 

     lisinopriL            Yes                                     [facility]



     20 mg      1-26                                              ity of



     tablet      00:00:                                              [place]



                                                               [facility]

 

     lisinopriL      0      Yes                                     Univers



     20 mg      1-26                                              ity of



     tablet      00:00:                                              [place]



                                                               [facility]

 

     lisinopriL      0      Yes                                     Univers



     20 mg      1-26                                              ity of



     tablet      00:00:                                              [place]



                                                               [facility]

 

     lisinopriL      0      Yes                                     Univers



     20 mg      1-26                                              ity of



     tablet      00:00:                                              [place]



                                                               [facility]

 

     lisinopriL      0      Yes                                     Univers



     20 mg      1-26                                              ity of



     tablet      00:00:                                              [place]



                                                               Medical



                                                                 Branch

 

     DULoxetine            Yes                      TAKE ONE           Uni

vers



     60 mg      1-14                               (1)            ity of



     capsule      00:00:                               CAPSULE(S)           [place]

s



               00                                 BY MOUTH           Medical



                                                  TWICE A           Branch



                                                  DAY FOR           



                                                  NERVE           



                                                  PAIN.           

 

     DULoxetine            Yes                      TAKE ONE           Uni

vers



     60 mg      1-14                               (1)            ity of



     capsule      00:00:                               CAPSULE(S)           [place]

s



               00                                 BY MOUTH           Medical



                                                  TWICE A           Branch



                                                  DAY FOR           



                                                  NERVE           



                                                  PAIN.           

 

     DULoxetine            Yes                      TAKE ONE           Uni

vers



     60 mg      1-14                               (1)            ity of



     capsule      00:00:                               CAPSULE(S)           [place]

s



               00                                 BY MOUTH           Medical



                                                  TWICE A           Branch



                                                  DAY FOR           



                                                  NERVE           



                                                  PAIN.           

 

     DULoxetine            Yes                      TAKE ONE           Uni

vers



     60 mg      1-14                               (1)            ity of



     capsule      00:00:                               CAPSULE(S)           [place]

s



               00                                 BY MOUTH           Medical



                                                  TWICE A           Branch



                                                  DAY FOR           



                                                  NERVE           



                                                  PAIN.           

 

     DULoxetine            Yes                      TAKE ONE           Uni

vers



     60 mg      1-14                               (1)            ity of



     capsule      00:00:                               CAPSULE(S)           [place]

s



               00                                 BY MOUTH           Medical



                                                  TWICE A           Branch



                                                  DAY FOR           



                                                  NERVE           



                                                  PAIN.           

 

     HYDROcodone            Yes                      TAKE ONE           Un

chetan



     -acetaminop      1-12                               (1)            ity of



     hen       00:00:                               TABLET(S)           Te

[place]



     mg tablet      00                                 BY MOUTH           Medica

l



                                                  FOUR TIMES           Branch



                                                  A DAY AS           



                                                  NEEDED FOR           



                                                  PAIN.           

 

     HYDROcodone      0      Yes                      TAKE ONE           Un

chetan



     -acetaminop      1-12                               (1)            ity of



     hen       00:00:                               TABLET(S)           Te

[place]



     mg tablet      00                                 BY MOUTH           Medica

l



                                                  FOUR TIMES           Branch



                                                  A DAY AS           



                                                  NEEDED FOR           



                                                  PAIN.           

 

     HYDROcodone      0      Yes                      TAKE ONE           Un

chetan



     -acetaminop      1-12                               (1)            ity of



     hen       00:00:                               TABLET(S)           Te

[place]



     mg tablet      00                                 BY MOUTH           Medica

l



                                                  FOUR TIMES           Branch



                                                  A DAY AS           



                                                  NEEDED FOR           



                                                  PAIN.           

 

     HYDROcodone      0      Yes                      TAKE ONE           Un

chetan



     -acetaminop      1-12                               (1)            ity of



     hen       00:00:                               TABLET(S)           Te

[place]



     mg tablet      00                                 BY MOUTH           Medica

l



                                                  FOUR TIMES           Branch



                                                  A DAY AS           



                                                  NEEDED FOR           



                                                  PAIN.           

 

     HYDROcodone            Yes                      TAKE ONE           Un

chetan



     -acetaminop      1-12                               (1)            ity of



     hen       00:00:                               TABLET(S)           Te

[place]



     mg tablet      00                                 BY MOUTH           Medica

l



                                                  FOUR TIMES           Branch



                                                  A DAY AS           



                                                  NEEDED FOR           



                                                  PAIN.           

 

     traMADoL            Yes                      TAKE ONE           Unive

rs



     200 mg 24      1-05                               (1)            ity of



     hr tablet      00:00:                               TABLET(S)           [facility]

as



               00                                 BY MOUTH           Medical



                                                  ONCE A DAY           Branch



                                                  AS NEEDED           



                                                  FOR PAIN.           

 

     traMADoL            Yes                      TAKE ONE           Unive

rs



     200 mg 24      1-05                               (1)            ity of



     hr tablet      00:00:                               TABLET(S)           [facility]

as



               00                                 BY MOUTH           Medical



                                                  ONCE A DAY           Branch



                                                  AS NEEDED           



                                                  FOR PAIN.           

 

     traMADoL      0      Yes                      TAKE ONE           Unive

rs



     200 mg 24      1-05                               (1)            ity of



     hr tablet      00:00:                               TABLET(S)           [facility]

as



               00                                 BY MOUTH           Medical



                                                  ONCE A DAY           Branch



                                                  AS NEEDED           



                                                  FOR PAIN.           

 

     traMADoL      0      Yes                      TAKE ONE           Unive

rs



     200 mg 24      1-05                               (1)            ity of



     hr tablet      00:00:                               TABLET(S)           [facility]

as



               00                                 BY MOUTH           Medical



                                                  ONCE A DAY           Branch



                                                  AS NEEDED           



                                                  FOR PAIN.           

 

     traMADoL            Yes                      TAKE ONE           Unive

rs



     200 mg 24      1-05                               (1)            ity of



     hr tablet      00:00:                               TABLET(S)           [facility]

as



               00                                 BY MOUTH           Medical



                                                  ONCE A DAY           Branch



                                                  AS NEEDED           



                                                  FOR PAIN.           

 

     CLENPIQ 10      2021      Yes                      TAKE AS           Univ

ers



     mg-3.5 gram      2-13                               DIRECTED           ity 

of



     -12       00:00:                               PER DOCTOR           [place]



     gram/160 mL      00                                 INSTRUCTIO           Me

dical



     Soln                                         NS.            Branch

 

     CLENPIQ 10      2021      Yes                      TAKE AS           Univ

ers



     mg-3.5 gram      2-13                               DIRECTED           ity 

of



     -12       00:00:                               PER DOCTOR           [place]



     gram/160 mL      00                                 INSTRUCTIO           Me

dical



     Soln                                         NS.            Branch

 

     CLENPIQ 10      2021      Yes                      TAKE AS           Univ

ers



     mg-3.5 gram      2-13                               DIRECTED           ity 

of



     -12       00:00:                               PER DOCTOR           [place]



     gram/160 mL      00                                 INSTRUCTIO           Me

dical



     Soln                                         NS.            Branch

 

     CLENPIQ 10      2021      Yes                      TAKE AS           Univ

ers



     mg-3.5 gram      2-13                               DIRECTED           ity 

of



     -12       00:00:                               PER DOCTOR           [place]



     gram/160 mL      00                                 INSTRUCTIO           Me

dical



     Soln                                         NS.            Branch

 

     CLENPIQ 10      2021      Yes                      TAKE AS           Univ

ers



     mg-3.5 gram      2-13                               DIRECTED           ity 

of



     -12       00:00:                               PER DOCTOR           [place]



     gram/160 mL      00                                 INSTRUCTIO           Me

dical



     Soln                                         NS.            Branch

 

     amLODIPine      2021      Yes            10mg      Take 10 mg           U

nivers



     10 mg      1-17                               by mouth           ity of



     tablet      00:00:                               daily.           [address]

 

     traZODone      2021      Yes                      TAKE ONE           Univ

ers



     150 mg      1-17                               (1) TABLET           ity of



     tablet      00:00:                               (150 MG)           [place]



               00                                 BY MOUTH           Medical



                                                  DAILY AS           Branch



                                                  NEEDED AT           



                                                  BEDTIME.           

 

     amLODIPine      2021      Yes            10mg      Take 10 mg           U

nivers



     10 mg      1-17                               by mouth           ity of



     tablet      00:00:                               daily.           [place]



                                                               [facility]

 

     traZODone      2021      Yes                      TAKE ONE           Univ

ers



     150 mg      1-17                               (1) TABLET           ity of



     tablet      00:00:                               (150 MG)           [place]



               00                                 BY MOUTH           Medical



                                                  DAILY AS           Branch



                                                  NEEDED AT           



                                                  BEDTIME.           

 

     amLODIPine      2021      Yes            10mg      Take 10 mg           U

nivers



     10 mg      1-17                               by mouth           ity of



     tablet      00:00:                               daily.           [address]

 

     traZODone      2021      Yes                      TAKE ONE           Univ

ers



     150 mg      1-17                               (1) TABLET           ity of



     tablet      00:00:                               (150 MG)           [place]



               00                                 BY MOUTH           Medical



                                                  DAILY AS           Branch



                                                  NEEDED AT           



                                                  BEDTIME.           

 

     amLODIPine      2021      Yes            10mg      Take 10 mg           U

nivers



     10 mg      1-17                               by mouth           ity of



     tablet      00:00:                               daily.           [place]



                                                               Medical



                                                                 Branch

 

     traZODone      2021      Yes                      TAKE ONE           Univ

ers



     150 mg      1-17                               (1) TABLET           ity of



     tablet      00:00:                               (150 MG)           [place]



               00                                 BY MOUTH           Medical



                                                  DAILY AS           Branch



                                                  NEEDED AT           



                                                  BEDTIME.           

 

     amLODIPine      2021      Yes            10mg      Take 10 mg           U

nivers



     10 mg      1-17                               by mouth           ity of



     tablet      00:00:                               daily.           [place]



                                                               Medical



                                                                 Branch

 

     traZODone      2021      Yes                      TAKE ONE           Univ

ers



     150 mg      1-17                               (1) TABLET           ity of



     tablet      00:00:                               (150 MG)           [place]



               00                                 BY MOUTH           Medical



                                                  DAILY AS           Branch



                                                  NEEDED AT           



                                                  BEDTIME.           







Vital Signs







             Vital Name   Observation Time Observation Value Comments     Source

 

             Systolic blood 2022 19:21:00 121 mm[Hg]                Univer

sity [facility] [place]

 

             Diastolic blood 2022 19:21:00 76 mm[Hg]                 [facility]



             pressure                                            [facility]

 

             Heart rate   2022 19:21:00 74 /min                   [facility]

 

             Body height  2022 19:21:00 163.8 cm                  [facility]

 

             Body weight  2022 19:21:00 72.394 kg                 [facility]

 

             BMI          2022 19:21:00 26.97 kg/m2               [facility]

 

             Oxygen saturation 2022 19:21:00 99 /min                   [facility]



             in Arterial blood                                        Medical Br

anch



             by Pulse oximetry                                        







Procedures







                Procedure       Date / Time     Performing Clinician Source



                                Performed                       

 

                PHYSICIAN CORRESPONDENCE 2022 06:01:00 Doctor Unassigned, 

[facility]



                                                [place]         [facility]

 

                [id number]         2021 00:00:00 CHORA           [facility]

 

                [id number]         2021 00:00:00 CHORA           [facility]

 

                [id number]         2021 00:00:00 JOHNATHAN           [facility]

 

                [id number]         2021 00:00:00 CHORA           [facility]







Encounters







        Start   End     Encounter Admission Attending Care    Care    Encounter 

Source



        Date/Time Date/Time Type    Type    Clinicians Facility Department ID   

   

 

        [facility]     [id number]

85 Univers



        16:50:26                         SHANT                           avinash [facility]

 

        20[facility]     [id number]

85 Univers



        08:55:42                         SHANT                           [facility]

 

        2022 [facility]            [facility]    1.2.840.114 [id number]

828 Univers



        00:00:00 00:00:00 Management         [facility]   HEALTH  350.1.13.10        

 ity of



                                                [facility]  4.2.7.2.686         [place]



                                                [place]    [phone]         [address]                 



                                                ([facility])                   

 

        2022 Outpatient TYLER YOUNG  [facility]    [id number]

686 Univers



        13:15:00 13:55:32                 JESUS                           avinash [facility]

 

        2022 [facility]          [facility]    1.2.840.114 [id number]

47 Univers



        13:15:00 13:30:00 Visit           Jesus S [facility]  350.1.13.10         it

y of



                                                [place] 4.2.7.2.686         Shiv

as



                                                BRISA?BLEA [phone]         Me

nakul ROMANO    [address] [place] [address]                 



                                                OFFICE                  



                                                [facility]                 

 

        2022 Outpatient TYLER YOUNG  [facility]    [id number]

686 Univers



        13:15:00 13:15:00                 JESUS                           avin[facility]

 

        [facility]    1.2.840.114 [id number]

35 Univers



        00:00:00 00:00:00 (Out)           Jesus S HEALTH  350.1.13.10         it

y of



                                                [place] 4.2.7.2.686         Shiv

as



                                                BRISA?BLEA [phone]         Me

nakul ROMANO    [address] [place] [address]                 



                                                OFFICE                  



                                                [facility]                 

 

        [facility]    1.2.754.250 [id number]

3109 Univers



        00:00:00 00:00:00 (Out)           Shant L HEALTH  350.1.13.10         it

y of



                                                [place] 4.2.7.2.686         Shiv

as



                                                BRISA?BLEA [phone]         Me

[address]                 



                                                OFFICE                  



                                                BUILDING                 

 

        2022 Telephone         [facility]    1.2.840.114 [id number]

[id number] Univers



        00:00:00 00:00:00                 Shant L HEALTH  350.1.13.10         it

y of



                                                [facility] 4.2.7.2.686         Shiv

as



                                                BRISA?BLEA [phone]         [address]                 

 

        2022 Orders          Doctor  ABRAN    1.2.840.114 [id number]

69 Univers



        00:00:00 00:00:00 Only            Unassigned, ELIEZER   350.1.13.10       

  ity of



                                        [place] [facility] 4.2.7.2.686         Shiv

as



                                                        [phone]         [address]

 

        2022 Telephone         [facility]    1.2.269.259 [id number]

1325 Univers



        00:00:00 00:00:00                 Jesus S HEALTH  350.1.13.10         it

y of



                                                [facility] 4.2.7.2.686         Shiv

as



                                                BRISA?BLEA [phone]         [address]                 

 

        2022 Outpatient R       [facility]    SOR     [id number]

13434 Univers



        06:30:00 15:41:00                 SHANT hansenavinash [facility]

 

        2022 [facility]    1.2.840.114 [id number]

[id number] Univers



        06:30:00 15:41:00 Encounter         Shant DOOLYE 350.1.13.10        

 ity of



                                                [place] 4.2.7.2.686         [place]

[place]



                                                SURGICAL [phone]         [address]

 

        2022 Outpatient R       [facility]    SOR     [id number]

[id number] Univers



        06:30:00 15:41:00                 SHANT                           itavinash [facility]

 

        2022 Surgery         [facility]    1.2.041.784 [id number]

3098 Univers



        07:30:00 10:17:00                 Shant DOOLEY 350.1.13.10         i

ty of



                                                [facility] 4.2.7.2.686         [place]

[place]



                                                SURGICAL [phone]         Med

ical



                                                CENTER  020             Branch

 

        2022 Nurse           ABRAN Damon    1.2.840.114 [id number]

21 Univers



        00:00:00 00:00:00 Triage          Cathleen MARTINS   350.1.13.10         

ity of



                                                HOSPITAL 4.2.7.2.686         Shiv

as



                                                        [phone]         Medi

kirsty



                                                        019             Branch

 

        2022 Orders          Doctor  ABRAN    1.2.840.114 [id number]

06 Univers



        00:00:00 00:00:00 Only            Unassigned, ELIEZER   350.1.13.10       

  ity of



                                        [place] [facility] 4.2.7.2.686         Shiv

as



                                                        [phone]         Medi

kirsty



                                                        009             Branch

 

        2022 [facility]    1.2.840.114 [id number]

[id number] Univers



        08:35:01 23:59:00 Encounter         Shant DOOLEY 350.1.13.10        

 ity of



                                                [place] 4.2.7.2.686         [facility]  [phone]         Medi

kirsty



                                                        850             Branch

 

        2022 Technician         Ashly, Destiny [facility]    1.2.8

40.114 [id number] 

Univers



        08:45:00 09:00:00 Visit           Shant Colby 350.1.13.10

         ity of



                                                [place] 4.2.7.2.686         [place]

[place]



                                                PROFESSIO [phone]         Me

[facility]     353             [facility]                 

 

        2022 Laboratory         Only, [facility]    1.2.840.

114 [id number] 

Univers



        08:30:00 08:45:00 Only            Shant Colby 350.1.13.10

         ity of



                                                [place] 4.2.7.2.[address] [place] [address]  [phone]         Medi

kirsty



                                                        353             [place]

 

        2022 [facility] R       [facility]    [id number]

[id number] Univers



        08:34:43 08:34:43                 SHANT                           itavinash of



                                                                        [facility]

 

        [facility]22 [facility]    1.2.840.114 [id number]

[id number] Univers



        08:34:43 08:34:43 Encounter         Shant DOOLEY 350.1.13.10        

 ity of



                                                [place] 4.2.7.2.686         [place]

[place]



                                                [place]  [phone]         Medi

kirsty



                                                        807             [place]

 

        2022 [facility]patient R       [facility]    [id number]

47761 Univers



        08:30:00 08:30:00                 SHANT keene [facility]

 

        2022 Outpatient R       [facility]    [id number]

53305 Univers



        08:30:00 08:30:00                 SHANT keene [facility]

 

        2022 Telephone         [facility]    1.2.840.114 [id number]

[id number] Univers



        00:00:00 00:00:00                 Shant MENDOSA [employer]  350.1.13.10         it

y of



                                                [facility] 4.2.7.2.686         Shiv

as



                                                BRISA?BLEA [phone]         Me

nakul ROMANO    [address] [place] [address]                 



                                                OFFICE                  



                                                [facility]                 

 

        2022 Telephone         [facility]    1.2.840.114 90

[id number] Univers



        00:00:00 00:00:00                 Shant MENDOSA [employer]  350.1.13.10         it

y of



                                                [facility] 4.2.7.2.686         Shiv

as



                                                BRISA?BLEA [phone]         Me

nakul ROMANO    [address] [place] [address]                 



                                                OFFICE                  



                                                [facility]                 

 

        2022 Orders          Doctor  ABRAN    1.2.840.114 [id number]

97 Univers



        00:00:00 00:00:00 Only            Unassigned, ELIEZER   350.1.13.10       

  ity of



                                        [place] [facility] 4.2.7.2.686         Shiv

as



                                                        [phone]         Medi

kirsty



                                                        009             [place]

 

        2022 Telephone         [facility]    1.2.840.114 90

[id number] Univers



        00:00:00 00:00:00                 Shant MENDOSA [employer]  350.1.13.10         it

y of



                                                [facility] 4.2.7.2.686         Shiv

as



                                                BRISA?BLEA [phone]         Me

nakul ROMANO    198             [facility]                 



                                                OFFICE                  



                                                [facility]                 

 

        2022 Telephone         MELISSA Colby    1.2.840.114 [id number]

[id number] Univers



        00:00:00 00:00:00                 Shant GOODMAN  350.1.13.10         it

y of



                                                [place] 4.2.7.2.686         Shiv

as



                                                BRISA?BLEA [phone]         Me

nakul ROMANO    [address]             [place]



                                                MEDICAL                 



                                                OFFICE                  



                                                [facility]                 

 

        2022 Prep For         E[facility]estine [facility]    1.2.840.114 [id number]

[id number] Univers



        00:00:00 00:00:00 Surgery         Shant MENDOSA HEALTH  350.1.13.10         it

y of



                                                [place] 4.2.7.2.686         Shiv

as



                                                BRISA?BLEA [phone]         Me

nakul ROMANO    198             [facility]                 

 

        2022 Orders          Doctor  ABRNA    1.2.840.114 [id number]

93 Univers



        00:00:00 00:00:00 Only            Unassigned, ELIEZER   350.1.13.10       

  ity of



                                        [place] [facility] 4.2.7.2.686         Shiv

as



                                                        [phone]         [address]

 

        2022 Telephone         Earnestine [facility]    1.2.840.114 90

[id number] Univers



        00:00:00 00:00:00                 Shant GOODMAN  350.1.13.10         it

y of



                                                [place] 4.2.7.2.686         Shiv

as



                                                BRISA?BLEA [phone]         Me

nakul ROMANO    198             [facility]                 

 

        2022 Telephone         [facility]    1.2.840.114 90

[id number] Univers



        00:00:00 00:00:00                 Shant GOODMAN  350.1.13.10         it

y of



                                                [place] 4.2.7.2.686         Shiv

as



                                                BRISA?BLEA [phone]         Me

nakul ROMANO    198             [facility]                 

 

        2022 Letter          Earnestine [facility]    1.2.607.476 [id number]

8247 Univers



        00:00:00 00:00:00 (Out)           Shant MENDOSA HEALTH  350.1.13.10         it

y of



                                                [place] 4.2.7.2.686         Sihv

as



                                                BRISA?BLEA [phone]         Me

nakul ROMANO    [address]             [place]



                                                MEDICAL                 



                                                OFFICE                  



                                                [facility]                 

 

        2022 Hospital         [facility]    1.2.840.114 [id number]

15298 Univers



        15:05:00 23:59:00 Encounter         Shant MENDOSA [facility]  350.1.13.10         

ity of



                                                [place] 4.2.7.2.686         Shiv

as



                                                BRISA?BLEA [phone]         Me

nakul ROMANO    809             [facility]                 



                                                OFFICE                  



                                                [facility]                 

 

        2022 Outpatient R       [facility]    [id number]

16158 Univers



        15:05:00 23:59:00                 [facility]

 

        2022 Outpatient R       [facility]    14067

61955 Univers



        14:45:00 16:54:48                 [facility]

 

        2022 Office          [facility]    1.2.740.036 [id number]

1060 Univers



        14:45:00 16:54:48 Visit           [facility]  350.1.13.10         it

y of



                                                [place] 4.2.7.2.686         Shiv

as



                                                BRISA?BLEA [phone]         Me

nakul ROMANO    198             [facility]                 



                                                OFFICE                  



                                                [facility]                 

 

        2022 Orders          Doctor  ABRAN    1.2.840.114 [id number]

77 Univers



        00:00:00 00:00:00 Only            Unassigned, ELIEZER   350.1.13.10       

  ity of



                                        [place] [facility] 4.2.7.2.686         Shiv

as



                                                        [phone]         [address]

 

        2022 [facility]       [facility]     [id number]

78 Univers



        07:09:00 12:01:00                 JOSUE keene [facility]

 

        2022 [facility] X       [facility]     [id number]

78 Univers



        07:09:00 12:01:00                 JOSUE keene [facility]

 

        2022 Emergency X       [facility]     [id number]

78 Univers



        07:09:00 12:01:00                 JOSUE keene [facility]

 

        2022 Emergency         [facility]    1.2.511.761 [id number]

5836 Univers



        07:09:00 12:01:00                 Josue DOOLEY 350.1.13.10         i

ty cale JENSEN 4.2.7.2.686         [facility]  [phone]         Medi

kirsty



                                                        [address]             [place]

 

        2021 Inpatient EM      [facility]     [id number]

2788 [facility]



        12:12:00 14:11:00                                         86      [facility]



                                                                        are



                                                                        [facility]







Results







           Test Description Test Time  Test Comments Results    Result Comments 

Source









                    ARTERIAL BLOOD GAS  2021 06:43:00 









                      Test Item  Value      Reference Range Interpretation Comme

nts









             ARTERIAL BLOOD GAS PH (test 7.495        7.35-7.45    H            



             code = PHA)                                         

 

             ARTERIAL BLOOD GAS PCO2 (test 32.1 mmHg    35-45        L          

  



             code = PCO2A)                                        

 

             ARTERIAL BLOOD GAS PO2 (test 71.2 mmHg           L           

 



             code = PO2A)                                        

 

             BICARBONATE TOTAL HCO3 (test 24.2 mmol/L  22-26        N           

 



             code = HCO3)                                        

 

             BASE EXCESS (test code = TINA) 1.5 mmol/L   See_Comment  N          

   [Automated message] The



                                                                 system which ge

nerated this



                                                                 result transmit

maribell reference



                                                                 range: (+/-)2.0

. The



                                                                 reference range

 was not used



                                                                 to interpret th

is result as



                                                                 normal/abnormal

.

 

             ABG O2 SATURATION (test code = 93.3 %       95.0-100.0   L         

   



             SATA)                                               

 

             ABG TYPE (test code = TYPEA) Arterial                              

 

 

             ARTERIAL FIO2 (test code = 50.0 %                                 



             FIO2A)                                              

 

             ABG VENT MODE (test code = PRVC                                   



             MODEA)                                              

 

             ABG VENT RESP RATE (test code 15 /MIN                              

  



             = RRA)                                              

 

             ABG TIDAL VOLUME (test code = 360.0 ML                             

  



             TIDAL VOLUME)                                        

 

             ABG PEEP (test code = PEEP) 5.0 cmH2O                              

 

             ALLENS TEST (test code = Yes                                    



             ALLENS)                                             

 

             TOTAL HGB (test code = THB) 12.6 g/dL    12.0-16.0    N            



ARTERIAL BLOOD [id number] 06:43:00





             Test Item    Value        Reference Range Interpretation Comments

 

             ARTERIAL BLOOD GAS PH 7.495        7.35-7.45    H            



             (test code = PHA)                                        

 

             ARTERIAL BLOOD GAS 32.1 mmHg    35-45        L            



             PCO2 (test code =                                        



             PCO2A)                                              

 

             ARTERIAL BLOOD GAS 71.2 mmHg           L            



             PO2 (test code =                                        



             PO2A)                                               

 

             BICARBONATE TOTAL 24.2 mmol/L  22-26        N            



             HCO3 (test code =                                        



             HCO3)                                               

 

             BASE EXCESS (test 1.5 mmol/L   See_Comment  N             [Automate

d message]



             code = TINA)                                         The system [employer]



                                                                 generated this 

result



                                                                 transmitted ref

erence



                                                                 range: (+/-)2.0

. The



                                                                 reference range

 was



                                                                 not used to int

erpret



                                                                 this result as



                                                                 normal/abnormal

.

 

             ABG O2 SATURATION 93.3 %       95.0-100.0   L            



             (test code = SATA)                                        

 

             ABG TYPE (test code = Arterial                               



             TYPEA)                                              

 

             ARTERIAL FIO2 (test 50.0 %                                 



             code = FIO2A)                                        

 

             ABG VENT MODE (test PRVC                                   



             code = MODEA)                                        

 

             ABG VENT RESP RATE 15 /MIN                                



             (test code = RRA)                                        

 

             ABG TIDAL VOLUME 360.0 ML                               



             (test code = TIDAL                                        



             VOLUME)                                             

 

             ABG PEEP (test code = 5.0 cmH2O                              



             PEEP)                                               

 

             ALLENS TEST (test Yes                                    



             code = ALLENS)                                        

 

             TOTAL HGB (test code 12.6 g/dL    12.0-16.0    N            



             = THB)                                              



[id number] 07:47:00





             Test Item    Value        Reference Range Interpretation Comments

 

             PHOSPHOROUS (test code = PHOS) 5.0 mg/dl    2.5-4.6      H         

   



[id number] 07:47:00





             Test Item    Value        Reference Range Interpretation Comments

 

             MAGNESIUM (test code = MAG) 2.3 mg/dl    1.8-2.5      N            



CBC W/AUTO [id number] 05:52:00





             Test Item    Value        Reference Range Interpretation Comments

 

             WHITE BLOOD CELL (test code = 12.8 x10 3/uL 3.2-11.5     H         

   



             WBC)                                                

 

             RED BLOOD CELL (test code = 4.45 x10(6)/m 3.70-5.10    N           

 



             RBC)                                                

 

             HEMOGLOBIN (test code = HGB) 12.3 g/dL    12.0-15.0    N           

 

 

             HEMATOCRIT (test code = HCT) 37.7 %       35.7-44.8    N           

 

 

             MEAN CELL VOLUME (test code = 85 fL               N          

  



             MCV)                                                

 

             MEAN CELL HGB (test code = MCH) 27.6 pg      26.2-33.8    N        

    

 

             MEAN CELL HGB CONCENTRATION 32.6 g/dL    30.0-34.0    N            



             (test code = MCHC)                                        

 

             RED CELL DISTRIBUTION WIDTH 12.1 %       11.3-14.5    N            



             (test code = RDW)                                        

 

             PLATELET COUNT (test code = 500 x10 3/uL 130-408      H            



             PLT)                                                

 

             MEAN PLATELET VOLUME (test code 9.3 fL       8.6-12.6     N        

    



             = MPV)                                              

 

             NEUTROPHIL % (test code = NT%) 74.4 %       40.0-70.0    H         

   

 

             IMMATURE GRANULOCYTE % (test 1.3 %        0.0-2.0      N           

 



             code = IG%)                                         

 

             LYMPHOCYTE % (test code = LY%) 13.5 %       20-40        L         

   

 

             MONOCYTE % (test code = MO%) 8.6 %        1-10         N           

 

 

             EOSINOPHIL % (test code = EO%) 1.7 %        0.0-5.0      N         

   

 

             BASOPHIL % (test code = BA%) 0.5 %        0.0-1.0      N           

 

 

             NUCLEATED RBC % (test code = 0.0 %        0.0-0.9      N           

 



             NRBC%)                                              

 

             NEUTROPHIL # (test code = NT#) 9.5 x10 3/uL 1.6-7.2      H         

   

 

             LYMPHOCYTE # (test code = LY#) 1.73 x10 3/uL 1.1-2.7      N        

    

 

             MONOCYTE # (test code = MO#) 1.1 x10 3/uL 0.3-0.8      H           

 

 

             EOSINOPHIL # (test code = EO#) 0.2 x10 3/uL 0.0-0.5      N         

   

 

             BASOPHIL # (test code = BA#) 0.1 x10 3/uL 0.0-0.1      N           

 



BASIC METABOLIC [id number] 05:34:00





             Test Item    Value        Reference Range Interpretation Comments

 

             SODIUM (test code = 137 mmol/L   135-145      N            



             NA)                                                 

 

             POTASSIUM (test code 3.1 mmol/L   3.6-5.0      L            



             = K)                                                

 

             CHLORIDE (test code = 92 mmol/L    101-111      L            



             CL)                                                 

 

             CARBON DIOXIDE (test 31 mmol/L    21-31        N            



             code = CO2)                                         

 

             GLUCOSE (test code = 93 mg/dl            N            



             GLU)                                                

 

             BLOOD UREA NITROGEN 33 mg/dl     6-20         H            



             (test code = BUN)                                        

 

             GLOMERULAR FILTRATION 31           >60          L            The es

timated



             RATE (test code =                                        glomerular

 filtration



             GFR)                                                rate is compute

d



                                                                 usingpatient ra

ce, age



                                                                 (>18), sex, and

 serum



                                                                 creatinine. If 

anyof



                                                                 the needed data



                                                                 elements are mi

ssing



                                                                 the Laboratory 

cannot



                                                                 compute an ale

mation



                                                                 of the glomerul

ar



                                                                 filtration rate

.

 

             CREATININE (test code 1.77 mg/dL   0.44-1.03    H            



             = CREAT)                                            

 

             CALCIUM (test code = 8.7 mg/dL    8.5-10.5     N            



             CA)                                                 



[id number] 05:30:00





             Test Item    Value        Reference Range Interpretation Comments

 

             GLUBED (test code = GLUBED) 91 MG/DL            N            



CBC W/AUTO [id number] 05:27:00





             Test Item    Value        Reference Range Interpretation Comments

 

             WHITE BLOOD CELL (test code = 12.8 x10 3/uL 3.2-11.5     H         

   



             WBC)                                                

 

             RED BLOOD CELL (test code = 4.45 x10(6)/m 3.70-5.10    N           

 



             RBC)                                                

 

             HEMOGLOBIN (test code = HGB) 12.3 g/dL    12.0-15.0    N           

 

 

             HEMATOCRIT (test code = HCT) 37.7 %       35.7-44.8    N           

 

 

             MEAN CELL VOLUME (test code = 85 fL               N          

  



             MCV)                                                

 

             MEAN CELL HGB (test code = MCH) 27.6 pg      26.2-33.8    N        

    

 

             MEAN CELL HGB CONCENTRATION 32.6 g/dL    30.0-34.0    N            



             (test code = MCHC)                                        

 

             RED CELL DISTRIBUTION WIDTH  %           11.3-14.5                 



             (test code = RDW)                                        

 

             PLATELET COUNT (test code =  x10 3/uL    130-408                   



             PLT)                                                

 

             MEAN PLATELET VOLUME (test code 9.3 fL       8.6-12.6     N        

    



             = MPV)                                              

 

             NEUTROPHIL % (test code = NT%)  %           40.0-70.0              

   

 

             LYMPHOCYTE % (test code = LY%)  %           20-40                  

   

 

             MONOCYTE % (test code = MO%)  %           1-10                     

 

 

             EOSINOPHIL % (test code = EO%)  %           0.0-5.0                

   

 

             BASOPHIL % (test code = BA%)  %           0.0-1.0                  

 

 

             NUCLEATED RBC % (test code =  %           0.0-0.9                  

 



             NRBC%)                                              

 

             NEUTROPHIL # (test code = NT#)  x10 3/uL    1.6-7.2                

   

 

             LYMPHOCYTE # (test code = LY#)  x10 3/uL    1.1-2.7                

   

 

             MONOCYTE # (test code = MO#)  x10 3/uL    0.3-0.8                  

 

 

             EOSINOPHIL # (test code = EO#)  x10 3/uL    0.0-0.5                

   



[id number] 20:33:00





             Test Item    Value        Reference Range Interpretation Comments

 

             GLUBED (test code = GLUBED) 108 MG/DL           H            



[id number] 15:27:00





             Test Item    Value        Reference Range Interpretation Comments

 

             GLUBED (test code = GLUBED) 112 MG/DL           H            



[id number] 11:48:00





             Test Item    Value        Reference Range Interpretation Comments

 

             GLUBED (test code = GLUBED) 103 MG/DL           N            



BASIC METABOLIC [id number] 07:12:00





             Test Item    Value        Reference Range Interpretation Comments

 

             SODIUM (test code = 136 mmol/L   135-145                   



             NA)                                                 

 

             POTASSIUM (test code 3.7 mmol/L   3.6-5.0      N            



             = K)                                                

 

             CHLORIDE (test code = 93 mmol/L    101-111      L            



             CL)                                                 

 

             CARBON DIOXIDE (test 26 mmol/L    21-31        N            



             code = CO2)                                         

 

             GLUCOSE (test code = 106 mg/dl           H            



             GLU)                                                

 

             BLOOD UREA NITROGEN 40 mg/dl     6-20         H            



             (test code = BUN)                                        

 

             GLOMERULAR FILTRATION 29           >60          L            The es

timated



             RATE (test code =                                        glomerular

 filtration



             GFR)                                                rate is compute

d



                                                                 usingpatient ra

ce, age



                                                                 (>18), sex, and

 serum



                                                                 creatinine. If 

anyof



                                                                 the needed data



                                                                 elements are mi

ssing



                                                                 the Laboratory 

cannot



                                                                 compute an ale

mation



                                                                 of the glomerul

ar



                                                                 filtration rate

.

 

             CREATININE (test code 1.87 mg/dL   0.44-1.03    H            



             = CREAT)                                            

 

             CALCIUM (test code = 9.0 mg/dL    8.5-10.5     N            



             CA)                                                 



[id number] 07:12:00





             Test Item    Value        Reference Range Interpretation Comments

 

             MAGNESIUM (test code = MAG) 2.4 mg/dl    1.8-2.5      N            



[id number] 06:27:00





             Test Item    Value        Reference Range Interpretation Comments

 

             GLUBED (test code = GLUBED) 125 MG/DL           H            



CBC W/AUTO [id number] 05:28:00





             Test Item    Value        Reference Range Interpretation Comments

 

             WHITE BLOOD CELL (test code = 12.2 x10 3/uL 3.2-11.5     H         

   



             WBC)                                                

 

             RED BLOOD CELL (test code = 4.12 x10(6)/m 3.70-5.10    N           

 



             RBC)                                                

 

             HEMOGLOBIN (test code = HGB) 11.3 g/dL    12.0-15.0    L           

 

 

             HEMATOCRIT (test code = HCT) 35.4 %       35.7-44.8    L           

 

 

             MEAN CELL VOLUME (test code = 86 fL               N          

  



             MCV)                                                

 

             MEAN CELL HGB (test code = MCH) 27.4 pg      26.2-33.8    N        

    

 

             MEAN CELL HGB CONCENTRATION 31.9 g/dL    30.0-34.0    N            



             (test code = MCHC)                                        

 

             RED CELL DISTRIBUTION WIDTH 12.5 %       11.3-14.5    N            



             (test code = RDW)                                        

 

             PLATELET COUNT (test code = 458 x10 3/uL 130-408      H            



             PLT)                                                

 

             MEAN PLATELET VOLUME (test code 9.1 fL       8.6-12.6     N        

    



             = MPV)                                              



WBC [id number] 05:28:00





             Test Item    Value        Reference Range Interpretation Comments

 

             TOTAL CELLS COUNTED (test code = 100 #CELLS                        

     



             TCC)                                                

 

             SEGMENTED NEUTROPHILS (test code 72 %         43-65        H       

     



             = SEG)                                              

 

             BAND NEUTROPHIL (test code = 3 %          0-1          H           

 



             BAND)                                               

 

             LYMPHOCYTE (test code = LYMPH) 14 %         20.5-45.5    L         

   

 

             MONOCYTE (test code = MON) 4 %          5.5-11.7     L            

 

             EOSINOPHIL (test code = EOS) 1 %          0.9-2.9      N           

 

 

             METAMYELOCYTE (test code = META) 3 %          0-0          H       

     

 

             MYELOCYTE (test code = MYELO) 3 %          0-0          H          

  

 

             BAND ABSOLUTE (test code = 0.37 10 3/uL 0.00-0.70    N            



             BAND#)                                              

 

             NEUTROPHIL ABSOLUTE (test code = 8.78 10 3/uL 1.6-7.2      H       

     



             SEG#)                                               

 

             LYMPH ABSOLUTE (test code = 1.7 10 3/uL  1.1-4.8      N            



             LYMPH#)                                             

 

             ATYPICAL LYMPH ABSOLUTE (test 0.00 10 3/uL 0.00-0.00    N          

  



             code = ALYMPH#)                                        

 

             MONOCYTE ABSOLUTE (test code = 0.48 10 3/uL 0.3-0.8      N         

   



             MON#)                                               

 

             BASOPHIL ABSOLUTE (test code = 0.00 10 3/uL 0.00-0.1     N         

   



             BASO#)                                              

 

             EOSINOPHIL ABSOLUTE (test code = 0.12 10 3/uL 0.00-0.50    N       

     



             EOS#)                                               

 

             METAMYELOCYTE ABSOLUTE (test 0.37 10 3/uL 0.00-0.00    H           

 



             code = META#)                                        

 

             MYELOCYTE ABSOLUTE (test code = 0.37 10 3/uL 0.00-0.00    H        

    



             MYELO#)                                             

 

             PROMYELOCYTE ABSOLUTE (test code 0.00 10 3/uL 0.00-0.00    N       

     



             = PROM#)                                            

 

             BLASTS ABSOLUTE (test code = 0.00 10 3/uL 0.00-0.00    N           

 



             BLAST#)                                             

 

             OTHER CELLS ABSOLUTE (test code 0.00 10 3/uL 0.00-0.00    N        

    



             = OCT#)                                             

 

             RBC MORPHOLOGY COMMENT (test Normal       NORMAL                   

 



             code = MOC)                                         

 

             PLATELET MORPHOLOGY (test code = Normal       NORMAL               

     



             PLTMORPH)                                           



BASIC METABOLIC [id number] 04:58:00





             Test Item    Value        Reference Range Interpretation Comments

 

             SODIUM (test code = 144 mmol/L   135-145      N            



             NA)                                                 

 

             POTASSIUM (test code 3.9 mmol/L   3.6-5.0      N            



             = K)                                                

 

             CHLORIDE (test code = 95 mmol/L    101-111      L            



             CL)                                                 

 

             CARBON DIOXIDE (test 34 mmol/L    21-31        H            



             code = CO2)                                         

 

             GLUCOSE (test code = 134 mg/dl           H            



             GLU)                                                

 

             BLOOD UREA NITROGEN 46 mg/dl     6-20         H            



             (test code = BUN)                                        

 

             GLOMERULAR FILTRATION 26           >60          L            The es

timated



             RATE (test code =                                        glomerular

 filtration



             GFR)                                                rate is compute

d



                                                                 usingpatient ra

ce, age



                                                                 (>18), sex, and

 serum



                                                                 creatinine. If 

anyof



                                                                 the needed data



                                                                 elements are mi

ssing



                                                                 the Laboratory 

cannot



                                                                 compute an ale

mation



                                                                 of the glomerul

ar



                                                                 filtration rate

.

 

             CREATININE (test code 2.06 mg/dL   0.44-1.03    H            



             = CREAT)                                            

 

             CALCIUM (test code = 8.8 mg/dL    8.5-10.5     N            



             CA)                                                 



[id number] 04:58:00





             Test Item    Value        Reference Range Interpretation Comments

 

             PHOSPHOROUS (test code = PHOS) 5.9 mg/dl    2.5-4.6      H         

   



[id number] 04:58:00





             Test Item    Value        Reference Range Interpretation Comments

 

             MAGNESIUM (test code = MAG) 2.4 mg/dl    1.8-2.5      N            



CBC W/AUTO [id number] 04:40:00





             Test Item    Value        Reference Range Interpretation Comments

 

             WHITE BLOOD CELL (test code = 12.2 x10 3/uL 3.2-11.5     H         

   



             WBC)                                                

 

             RED BLOOD CELL (test code = 4.12 x10(6)/m 3.70-5.10    N           

 



             RBC)                                                

 

             HEMOGLOBIN (test code = HGB) 11.3 g/dL    12.0-15.0    L           

 

 

             HEMATOCRIT (test code = HCT) 35.4 %       35.7-44.8    L           

 

 

             MEAN CELL VOLUME (test code = 86 fL               N          

  



             MCV)                                                

 

             MEAN CELL HGB (test code = MCH) 27.4 pg      26.2-33.8    N        

    

 

             MEAN CELL HGB CONCENTRATION 31.9 g/dL    30.0-34.0    N            



             (test code = MCHC)                                        

 

             RED CELL DISTRIBUTION WIDTH 12.5 %       11.3-14.5    N            



             (test code = RDW)                                        

 

             PLATELET COUNT (test code = 458 x10 3/uL 130-408      H            



             PLT)                                                

 

             MEAN PLATELET VOLUME (test code 9.1 fL       8.6-12.6     N        

    



             = MPV)                                              



WBC [id number] 04:40:00





             Test Item    Value        Reference Range Interpretation Comments

 

             TOTAL CELLS COUNTED (test code = TCC)  #CELLS                      

          

 

             RBC MORPHOLOGY COMMENT (test code =              NORMAL            

        



             MOC)                                                

 

             PLATELET MORPHOLOGY (test code =              NORMAL               

     



             PLTMORPH)                                           



CBC W/AUTO [id number] 04:40:00





             Test Item    Value        Reference Range Interpretation Comments

 

             WHITE BLOOD CELL (test code = 12.2 x10 3/uL 3.2-11.5     H         

   



             WBC)                                                

 

             RED BLOOD CELL (test code = 4.12 x10(6)/m 3.70-5.10    N           

 



             RBC)                                                

 

             HEMOGLOBIN (test code = HGB) 11.3 g/dL    12.0-15.0    L           

 

 

             HEMATOCRIT (test code = HCT) 35.4 %       35.7-44.8    L           

 

 

             MEAN CELL VOLUME (test code = 86 fL               N          

  



             MCV)                                                

 

             MEAN CELL HGB (test code = MCH) 27.4 pg      26.2-33.8    N        

    

 

             MEAN CELL HGB CONCENTRATION 31.9 g/dL    30.0-34.0    N            



             (test code = MCHC)                                        

 

             RED CELL DISTRIBUTION WIDTH 12.5 %       11.3-14.5    N            



             (test code = RDW)                                        

 

             PLATELET COUNT (test code = 458 x10 3/uL 130-408      H            



             PLT)                                                

 

             MEAN PLATELET VOLUME (test code 9.1 fL       8.6-12.6     N        

    



             = MPV)                                              



WBC [id number] 04:40:00





             Test Item    Value        Reference Range Interpretation Comments

 

             TOTAL CELLS COUNTED (test code = TCC)  #CELLS                      

          

 

             RBC MORPHOLOGY COMMENT (test code =              NORMAL            

        



             MOC)                                                

 

             PLATELET MORPHOLOGY (test code =              NORMAL               

     



             PLTMORPH)                                           



[id number] 00:41:00





             Test Item    Value        Reference Range Interpretation Comments

 

             GLUBED (test code = GLUBED) 143 MG/DL           H            



[id number] 17:06:00





             Test Item    Value        Reference Range Interpretation Comments

 

             GLUBED (test code = GLUBED) 113 MG/DL           H            



[id number] 12:28:00





             Test Item    Value        Reference Range Interpretation Comments

 

             GLUBED (test code = GLUBED) 99 MG/DL            N            



CBC W/AUTO [id number] 07:23:00





             Test Item    Value        Reference Range Interpretation Comments

 

             WHITE BLOOD CELL (test code = 12.4 x10 3/uL 3.2-11.5     H         

   



             WBC)                                                

 

             RED BLOOD CELL (test code = 3.81 x10(6)/m 3.70-5.10    N           

 



             RBC)                                                

 

             HEMOGLOBIN (test code = HGB) 10.5 g/dL    12.0-15.0    L           

 

 

             HEMATOCRIT (test code = HCT) 33.0 %       35.7-44.8    L           

 

 

             MEAN CELL VOLUME (test code = 87 fL               N          

  



             MCV)                                                

 

             MEAN CELL HGB (test code = MCH) 27.6 pg      26.2-33.8    N        

    

 

             MEAN CELL HGB CONCENTRATION 31.8 g/dL    30.0-34.0    N            



             (test code = MCHC)                                        

 

             RED CELL DISTRIBUTION WIDTH 13.2 %       11.3-14.5    N            



             (test code = RDW)                                        

 

             PLATELET COUNT (test code = 479 x10 3/uL 130-408      H            



             PLT)                                                

 

             MEAN PLATELET VOLUME (test code 9.1 fL       8.6-12.6     N        

    



             = MPV)                                              



WBC [id number] 07:23:00





             Test Item    Value        Reference Range Interpretation Comments

 

             TOTAL CELLS COUNTED (test code = 100 #CELLS                        

     



             TCC)                                                

 

             SEGMENTED NEUTROPHILS (test code 64 %         43-65        N       

     



             = SEG)                                              

 

             LYMPHOCYTE (test code = LYMPH) 17 %         20.5-45.5    L         

   

 

             MONOCYTE (test code = MON) 13 %         5.5-11.7     H            

 

             EOSINOPHIL (test code = EOS) 6 %          0.9-2.9      H           

 

 

             BAND ABSOLUTE (test code = 0.00 10 3/uL 0.00-0.70    N            



             BAND#)                                              

 

             NEUTROPHIL ABSOLUTE (test code = 7.94 10 3/uL 1.6-7.2      H       

     



             SEG#)                                               

 

             LYMPH ABSOLUTE (test code = 2.1 10 3/uL  1.1-4.8      N            



             LYMPH#)                                             

 

             ATYPICAL LYMPH ABSOLUTE (test 0.00 10 3/uL 0.00-0.00    N          

  



             code = ALYMPH#)                                        

 

             MONOCYTE ABSOLUTE (test code = 1.61 10 3/uL 0.3-0.8      H         

   



             MON#)                                               

 

             BASOPHIL ABSOLUTE (test code = 0.00 10 3/uL 0.00-0.1     N         

   



             BASO#)                                              

 

             EOSINOPHIL ABSOLUTE (test code = 0.74 10 3/uL 0.00-0.50    H       

     



             EOS#)                                               

 

             METAMYELOCYTE ABSOLUTE (test 0.00 10 3/uL 0.00-0.00    N           

 



             code = META#)                                        

 

             MYELOCYTE ABSOLUTE (test code = 0.00 10 3/uL 0.00-0.00    N        

    



             MYELO#)                                             

 

             PROMYELOCYTE ABSOLUTE (test code 0.00 10 3/uL 0.00-0.00    N       

     



             = PROM#)                                            

 

             BLASTS ABSOLUTE (test code = 0.00 10 3/uL 0.00-0.00    N           

 



             BLAST#)                                             

 

             OTHER CELLS ABSOLUTE (test code 0.00 10 3/uL 0.00-0.00    N        

    



             = OCT#)                                             

 

             RBC MORPHOLOGY COMMENT (test Normal       NORMAL                   

 



             code = MOC)                                         

 

             PLATELET MORPHOLOGY (test code = Normal       NORMAL               

     



             PLTMORPH)                                           



ARTERIAL BLOOD [id number] 04:55:00





             Test Item    Value        Reference Range Interpretation Comments

 

             ARTERIAL BLOOD GAS PH 7.464        7.35-7.45    H            



             (test code = PHA)                                        

 

             ARTERIAL BLOOD GAS 47.0 mmHg    35-45        H            



             PCO2 (test code =                                        



             PCO2A)                                              

 

             ARTERIAL BLOOD .9 mmHg          H            



             PO2 (test code =                                        



             PO2A)                                               

 

             BICARBONATE TOTAL 33.0 mmol/L  22-26        H            



             HCO3 (test code =                                        



             HCO3)                                               

 

             BASE EXCESS (test 8.2 mmol/L   See_Comment  H             [Automate

d message]



             code = TINA)                                         The system [employer]



                                                                 generated this 

result



                                                                 transmitted ref

erence



                                                                 range: (+/-)2.0

. The



                                                                 reference range

 was



                                                                 not used to int

erpret



                                                                 this result as



                                                                 normal/abnormal

.

 

             ABG O2 SATURATION 98.3 %       95.0-100.0   N            



             (test code = SATA)                                        

 

             ABG TYPE (test code = Arterial                               



             TYPEA)                                              

 

             ARTERIAL FIO2 (test 40.0 %                                 



             code = FIO2A)                                        

 

             ABG VENT MODE (test Ventilator                             



             code = MODEA)                                        

 

             ABG VENT RESP RATE 22 /MIN                                



             (test code = RRA)                                        

 

             ABG PEEP (test code = 8.0 cmH2O                              



             PEEP)                                               

 

             ABG SITE (test code = Left Radial                            



             SITEA)                                              

 

             ALLENS TEST (test Yes                                    



             code = ALLENS)                                        

 

             TOTAL HGB (test code 11.4 g/dL    12.0-16.0    L            



             = THB)                                              



ARTERIAL BLOOD [id number] 04:55:00





             Test Item    Value        Reference Range Interpretation Comments

 

             ARTERIAL BLOOD GAS PH 7.464        7.35-7.45    H            



             (test code = PHA)                                        

 

             ARTERIAL BLOOD GAS 47.0 mmHg    35-45        H            



             PCO2 (test code =                                        



             PCO2A)                                              

 

             ARTERIAL BLOOD .9 mmHg          H            



             PO2 (test code =                                        



             PO2A)                                               

 

             BICARBONATE TOTAL 33.0 mmol/L  22-26        H            



             HCO3 (test code =                                        



             HCO3)                                               

 

             BASE EXCESS (test 8.2 mmol/L   See_Comment  H             [Automate

d message]



             code = TINA)                                         The system [employer]



                                                                 generated this 

result



                                                                 transmitted ref

erence



                                                                 range: (+/-)2.0

. The



                                                                 reference range

 was



                                                                 not used to int

erpret



                                                                 this result as



                                                                 normal/abnormal

.

 

             ABG O2 SATURATION 98.3 %       95.0-100.0   N            



             (test code = SATA)                                        

 

             ABG TYPE (test code = Arterial                               



             TYPEA)                                              

 

             ARTERIAL FIO2 (test 40.0 %                                 



             code = FIO2A)                                        

 

             ABG VENT MODE (test Ventilator                             



             code = MODEA)                                        

 

             ABG VENT RESP RATE 22 /MIN                                



             (test code = RRA)                                        

 

             ABG PEEP (test code = 8.0 cmH2O                              



             PEEP)                                               

 

             ABG SITE (test code = Left Radial                            



             SITEA)                                              

 

             ALLENS TEST (test Yes                                    



             code = ALLENS)                                        

 

             TOTAL HGB (test code 11.4 g/dL    12.0-16.0    L            



             = THB)                                              



BASIC METABOLIC [id number] 04:44:00





             Test Item    Value        Reference Range Interpretation Comments

 

             SODIUM (test code = 147 mmol/L   135-145      H            



             NA)                                                 

 

             POTASSIUM (test code 4.0 mmol/L   3.6-5.0      N            



             = K)                                                

 

             CHLORIDE (test code = 97 mmol/L    101-111      L            



             CL)                                                 

 

             CARBON DIOXIDE (test 34 mmol/L    21-31        H            



             code = CO2)                                         

 

             GLUCOSE (test code = 109 mg/dl           H            



             GLU)                                                

 

             BLOOD UREA NITROGEN 52 mg/dl     6-20         H            



             (test code = BUN)                                        

 

             GLOMERULAR FILTRATION 23           >60          L            The es

timated



             RATE (test code =                                        glomerular

 filtration



             GFR)                                                rate is compute

d



                                                                 usingpatient ra

ce, age



                                                                 (>18), sex, and

 serum



                                                                 creatinine. If 

anyof



                                                                 the needed data



                                                                 elements are mi

ssing



                                                                 the Laboratory 

cannot



                                                                 compute an ale

mation



                                                                 of the glomerul

ar



                                                                 filtration rate

.

 

             CREATININE (test code 2.29 mg/dL   0.44-1.03    H            



             = CREAT)                                            

 

             CALCIUM (test code = 8.6 mg/dL    8.5-10.5     N            



             CA)                                                 



[id number] 04:44:00





             Test Item    Value        Reference Range Interpretation Comments

 

             TRIGLYCERIDES (test code = TRIG) 206 mg/dL           H       

     



[id number] 04:44:00





             Test Item    Value        Reference Range Interpretation Comments

 

             MAGNESIUM (test code = MAG) 2.4 mg/dl    1.8-2.5      N            



VANCOMYCIN [id number] 04:39:00





             Test Item    Value        Reference Range Interpretation Comments

 

             VANCOMYCIN TROUGH 20.3 ug/ml   10.0-20.0    H            Please ref

er to



             (test code = VANCT)                                        Medicati

on



                                                                 Administration 

Record



                                                                 (MAR) forlast d

ose date



                                                                 and time.



CBC W/AUTO [id number] 04:39:00





             Test Item    Value        Reference Range Interpretation Comments

 

             WHITE BLOOD CELL (test code = 12.4 x10 3/uL 3.2-11.5     H         

   



             WBC)                                                

 

             RED BLOOD CELL (test code = 3.81 x10(6)/m 3.70-5.10    N           

 



             RBC)                                                

 

             HEMOGLOBIN (test code = HGB) 10.5 g/dL    12.0-15.0    L           

 

 

             HEMATOCRIT (test code = HCT) 33.0 %       35.7-44.8    L           

 

 

             MEAN CELL VOLUME (test code = 87 fL               N          

  



             MCV)                                                

 

             MEAN CELL HGB (test code = MCH) 27.6 pg      26.2-33.8    N        

    

 

             MEAN CELL HGB CONCENTRATION 31.8 g/dL    30.0-34.0    N            



             (test code = MCHC)                                        

 

             RED CELL DISTRIBUTION WIDTH 13.2 %       11.3-14.5    N            



             (test code = RDW)                                        

 

             PLATELET COUNT (test code = 479 x10 3/uL 130-408      H            



             PLT)                                                

 

             MEAN PLATELET VOLUME (test code 9.1 fL       8.6-12.6     N        

    



             = MPV)                                              



WBC [id number] 04:39:00





             Test Item    Value        Reference Range Interpretation Comments

 

             TOTAL CELLS COUNTED (test code = TCC)  #CELLS                      

          

 

             RBC MORPHOLOGY COMMENT (test code =              NORMAL            

        



             MOC)                                                

 

             PLATELET MORPHOLOGY (test code =              NORMAL               

     



             PLTMORPH)                                           



CBC W/AUTO [id number] 04:39:00





             Test Item    Value        Reference Range Interpretation Comments

 

             WHITE BLOOD CELL (test code = 12.4 x10 3/uL 3.2-11.5     H         

   



             WBC)                                                

 

             RED BLOOD CELL (test code = 3.81 x10(6)/m 3.70-5.10    N           

 



             RBC)                                                

 

             HEMOGLOBIN (test code = HGB) 10.5 g/dL    12.0-15.0    L           

 

 

             HEMATOCRIT (test code = HCT) 33.0 %       35.7-44.8    L           

 

 

             MEAN CELL VOLUME (test code = 87 fL               N          

  



             MCV)                                                

 

             MEAN CELL HGB (test code = MCH) 27.6 pg      26.2-33.8    N        

    

 

             MEAN CELL HGB CONCENTRATION 31.8 g/dL    30.0-34.0    N            



             (test code = MCHC)                                        

 

             RED CELL DISTRIBUTION WIDTH 13.2 %       11.3-14.5    N            



             (test code = RDW)                                        

 

             PLATELET COUNT (test code = 479 x10 3/uL 130-408      H            



             PLT)                                                

 

             MEAN PLATELET VOLUME (test code 9.1 fL       8.6-12.6     N        

    



             = MPV)                                              



WBC [id number] 04:39:00





             Test Item    Value        Reference Range Interpretation Comments

 

             TOTAL CELLS COUNTED (test code = TCC)  #CELLS                      

          

 

             RBC MORPHOLOGY COMMENT (test code =              NORMAL            

        



             MOC)                                                

 

             PLATELET MORPHOLOGY (test code =              NORMAL               

     



             PLTMORPH)                                           



[id number] 23:56:00





             Test Item    Value        Reference Range Interpretation Comments

 

             GLUBED (test code = GLUBED) 101 MG/DL           N            



[id number] 17:37:00





             Test Item    Value        Reference Range Interpretation Comments

 

             GLUBED (test code = GLUBED) 113 MG/DL           H            



- XR CHEST 1 -08-10 11:57:00
********************************************************[facility] : 1965 Sex: 
F************************************************************PatientName: 
GAIL VELÁZQUEZ Unit No: [id number] EXAMS: CPT: [id number] XR CHEST 1 V 58100 CHEST 
RADIOGRAPH - 1view CLINICAL HISTORY: intubated. COMPARISON: 2021. 
FINDINGS: Patient is rotated. Ventilatory tube and feeding tube again seen. 
Heart size normal. Vascular congestion, bibasilar pulmonary opacities and left 
pleural effusion. Findings are relatively stable taking into account technical 
differences. IMPRESSION: Vascular congestion, pulmonary opacities and small left
pleural effusion. Findingsare relatively stable. Recommend continued follow-up. 
** Electronically Signed by Adal Ann MD on 08/10/2021 at 1157 **  Reported and 
signed by: Adal Ann MD CC: Stu Chance MD; Cande OSHEA
echnologist: Katherine Sommer Time: DAP (Gy m2): Air Kerma (mGy): Trscr Dt/Tm: 
08/10/2021 (1157) by:[email or web address] Orig Print D/T: S: 08/10/2021 (1200) BATCH NO: 
N/A Name: GAIL VELÁZQUEZ  [facility] Phys: Stu Nicholas  
[place] : 1965 Age: 56 Sex: F [place], [place] [address] Acct No: 
[id number] Loc: N.2042 1 Exam Date: 08/10/2021 Status: ADM IN PH: FAX: PAGE 1 
Signed ReportGLUBED2021-08-10 11:26:00





             Test Item    Value        Reference Range Interpretation Comments

 

             GLUBED (test code = GLUBED) 99 MG/DL            N            



GLUBED2021-08-10 07:43:00





             Test Item    Value        Reference Range Interpretation Comments

 

             GLUBED (test code = GLUBED) 98 MG/DL            N            



BASIC METABOLIC PANEL2021-08-10 05:54:00





             Test Item    Value        Reference Range Interpretation Comments

 

             SODIUM (test code = 146 mmol/L   135-145      H            



             NA)                                                 

 

             POTASSIUM (test code 3.8 mmol/L   3.6-5.0      N            



             = K)                                                

 

             CHLORIDE (test code = 96 mmol/L    101-111      L            



             CL)                                                 

 

             CARBON DIOXIDE (test 36 mmol/L    21-31        H            



             code = CO2)                                         

 

             GLUCOSE (test code = 94 mg/dl            N            



             GLU)                                                

 

             BLOOD UREA NITROGEN 48 mg/dl     6-20         H            



             (test code = BUN)                                        

 

             GLOMERULAR FILTRATION 25           >60          L            The es

timated



             RATE (test code =                                        glomerular

 filtration



             GFR)                                                rate is compute

d



                                                                 usingpatient ra

ce, age



                                                                 (>18), sex, and

 serum



                                                                 creatinine. If 

anyof



                                                                 the needed data



                                                                 elements are mi

ssing



                                                                 the Laboratory 

cannot



                                                                 compute an ale

mation



                                                                 of the glomerul

ar



                                                                 filtration rate

.

 

             CREATININE (test code 2.15 mg/dL   0.44-1.03    H            



             = CREAT)                                            

 

             CALCIUM (test code = 8.7 mg/dL    8.5-10.5     N            



             CA)                                                 



[id number] 05:54:00





             Test Item    Value        Reference Range Interpretation Comments

 

             PHOSPHOROUS (test code = PHOS) 5.4 mg/dl    2.5-4.6      H         

   



MAGNESIUM2021-08-10 05:54:00





             Test Item    Value        Reference Range Interpretation Comments

 

             MAGNESIUM (test code = MAG) 2.5 mg/dl    1.8-2.5      N            



CBC W/AUTO DIFF2021-08-10 05:42:00





             Test Item    Value        Reference Range Interpretation Comments

 

             WHITE BLOOD CELL (test code = 9.9 x10 3/uL 3.2-11.5     N          

  



             WBC)                                                

 

             RED BLOOD CELL (test code = 3.78 x10(6)/m 3.70-5.10    N           

 



             RBC)                                                

 

             HEMOGLOBIN (test code = HGB) 10.3 g/dL    12.0-15.0    L           

 

 

             HEMATOCRIT (test code = HCT) 32.3 %       35.7-44.8    L           

 

 

             MEAN CELL VOLUME (test code = 85 fL               N          

  



             MCV)                                                

 

             MEAN CELL HGB (test code = MCH) 27.2 pg      26.2-33.8    N        

    

 

             MEAN CELL HGB CONCENTRATION 31.9 g/dL    30.0-34.0    N            



             (test code = MCHC)                                        

 

             RED CELL DISTRIBUTION WIDTH 13.0 %       11.3-14.5    N            



             (test code = RDW)                                        

 

             PLATELET COUNT (test code = 456 x10 3/uL 130-408      H            



             PLT)                                                

 

             MEAN PLATELET VOLUME (test code 8.9 fL       8.6-12.6     N        

    



             = MPV)                                              



WBC DIFFERENTIAL2021-08-10 05:42:00





             Test Item    Value        Reference Range Interpretation Comments

 

             TOTAL CELLS COUNTED (test code = 100 #CELLS                        

     



             TCC)                                                

 

             SEGMENTED NEUTROPHILS (test code 56 %         43-65        N       

     



             = SEG)                                              

 

             BAND NEUTROPHIL (test code = 2 %          0-1          H           

 



             BAND)                                               

 

             LYMPHOCYTE (test code = LYMPH) 21 %         20.5-45.5    N         

   

 

             MONOCYTE (test code = MON) 11 %         5.5-11.7     N            

 

             EOSINOPHIL (test code = EOS) 5 %          0.9-2.9      H           

 

 

             BASOPHIL (test code = BASO) 1 %          0.2-1.0      N            

 

             METAMYELOCYTE (test code = META) 2 %          0-0          H       

     

 

             MYELOCYTE (test code = MYELO) 2 %          0-0          H          

  

 

             BAND ABSOLUTE (test code = 0.20 10 3/uL 0.00-0.70    N            



             BAND#)                                              

 

             NEUTROPHIL ABSOLUTE (test code = 5.54 10 3/uL 1.6-7.2      N       

     



             SEG#)                                               

 

             LYMPH ABSOLUTE (test code = 2.1 10 3/uL  1.1-4.8      N            



             LYMPH#)                                             

 

             ATYPICAL LYMPH ABSOLUTE (test 0.00 10 3/uL 0.00-0.00    N          

  



             code = ALYMPH#)                                        

 

             MONOCYTE ABSOLUTE (test code = 1.08 10 3/uL 0.3-0.8      H         

   



             MON#)                                               

 

             BASOPHIL ABSOLUTE (test code = 0.09 10 3/uL 0.00-0.1     N         

   



             BASO#)                                              

 

             EOSINOPHIL ABSOLUTE (test code = 0.49 10 3/uL 0.00-0.50    N       

     



             EOS#)                                               

 

             METAMYELOCYTE ABSOLUTE (test 0.20 10 3/uL 0.00-0.00    H           

 



             code = META#)                                        

 

             MYELOCYTE ABSOLUTE (test code = 0.20 10 3/uL 0.00-0.00    H        

    



             MYELO#)                                             

 

             PROMYELOCYTE ABSOLUTE (test code 0.00 10 3/uL 0.00-0.00    N       

     



             = PROM#)                                            

 

             BLASTS ABSOLUTE (test code = 0.00 10 3/uL 0.00-0.00    N           

 



             BLAST#)                                             

 

             OTHER CELLS ABSOLUTE (test code 0.00 10 3/uL 0.00-0.00    N        

    



             = OCT#)                                             

 

             RBC MORPHOLOGY COMMENT (test Normal       NORMAL                   

 



             code = MOC)                                         

 

             PLATELET MORPHOLOGY (test code = Normal       NORMAL               

     



             PLTMORPH)                                           



CBC W/AUTO DIFF2021-08-10 03:57:00





             Test Item    Value        Reference Range Interpretation Comments

 

             WHITE BLOOD CELL (test code = 9.9 x10 3/uL 3.2-11.5     N          

  



             WBC)                                                

 

             RED BLOOD CELL (test code = 3.78 x10(6)/m 3.70-5.10    N           

 



             RBC)                                                

 

             HEMOGLOBIN (test code = HGB) 10.3 g/dL    12.0-15.0    L           

 

 

             HEMATOCRIT (test code = HCT) 32.3 %       35.7-44.8    L           

 

 

             MEAN CELL VOLUME (test code = 85 fL               N          

  



             MCV)                                                

 

             MEAN CELL HGB (test code = MCH) 27.2 pg      26.2-33.8    N        

    

 

             MEAN CELL HGB CONCENTRATION 31.9 g/dL    30.0-34.0    N            



             (test code = MCHC)                                        

 

             RED CELL DISTRIBUTION WIDTH 13.0 %       11.3-14.5    N            



             (test code = RDW)                                        

 

             PLATELET COUNT (test code = 456 x10 3/uL 130-408      H            



             PLT)                                                

 

             MEAN PLATELET VOLUME (test code 8.9 fL       8.6-12.6     N        

    



             = MPV)                                              



WBC DIFFERENTIAL2021-08-10 03:57:00





             Test Item    Value        Reference Range Interpretation Comments

 

             TOTAL CELLS COUNTED (test code = TCC)  #CELLS                      

          

 

             RBC MORPHOLOGY COMMENT (test code =              NORMAL            

        



             MOC)                                                

 

             PLATELET MORPHOLOGY (test code =              NORMAL               

     



             PLTMORPH)                                           



CBC W/AUTO DIFF2021-08-10 03:57:00





             Test Item    Value        Reference Range Interpretation Comments

 

             WHITE BLOOD CELL (test code = 9.9 x10 3/uL 3.2-11.5     N          

  



             WBC)                                                

 

             RED BLOOD CELL (test code = 3.78 x10(6)/m 3.70-5.10    N           

 



             RBC)                                                

 

             HEMOGLOBIN (test code = HGB) 10.3 g/dL    12.0-15.0    L           

 

 

             HEMATOCRIT (test code = HCT) 32.3 %       35.7-44.8    L           

 

 

             MEAN CELL VOLUME (test code = 85 fL               N          

  



             MCV)                                                

 

             MEAN CELL HGB (test code = MCH) 27.2 pg      26.2-33.8    N        

    

 

             MEAN CELL HGB CONCENTRATION 31.9 g/dL    30.0-34.0    N            



             (test code = MCHC)                                        

 

             RED CELL DISTRIBUTION WIDTH 13.0 %       11.3-14.5    N            



             (test code = RDW)                                        

 

             PLATELET COUNT (test code = 456 x10 3/uL 130-408      H            



             PLT)                                                

 

             MEAN PLATELET VOLUME (test code 8.9 fL       8.6-12.6     N        

    



             = MPV)                                              



WBC DIFFERENTIAL2021-08-10 03:57:00





             Test Item    Value        Reference Range Interpretation Comments

 

             TOTAL CELLS COUNTED (test code = TCC)  #CELLS                      

          

 

             RBC MORPHOLOGY COMMENT (test code =              NORMAL            

        



             MOC)                                                

 

             PLATELET MORPHOLOGY (test code =              NORMAL               

     



             PLTMORPH)                                           



GLUBED2021-08-10 03:15:00





             Test Item    Value        Reference Range Interpretation Comments

 

             GLUBED (test code = GLUBED) 96 MG/DL            N            



[id number] 17:29:00





             Test Item    Value        Reference Range Interpretation Comments

 

             GLUBED (test code = GLUBED) 114 MG/DL           H            



[id number] 08:37:00





             Test Item    Value        Reference Range Interpretation Comments

 

             GLUBED (test code = GLUBED) 80 MG/DL            N            



[id number] 08:35:00





             Test Item    Value        Reference Range Interpretation Comments

 

             GLUBED (test code = GLUBED) 76 MG/DL            N            



[id number] 08:35:00





             Test Item    Value        Reference Range Interpretation Comments

 

             GLUBED (test code = GLUBED) 78 MG/DL            N            



[id number] 05:21:00





             Test Item    Value        Reference Range Interpretation Comments

 

             GLUBED (test code = GLUBED) 89 MG/DL            N            



CALCIUM [id number] 04:37:00





             Test Item    Value        Reference Range Interpretation Comments

 

             CALCIUM IONIZED (test code = SUZANNE) 1.18 mmol/L  1.13-1.32    N      

      



BASIC METABOLIC [id number] 04:34:00





             Test Item    Value        Reference Range Interpretation Comments

 

             SODIUM (test code = 143 mmol/L   135-145      N            



             NA)                                                 

 

             POTASSIUM (test code 3.2 mmol/L   3.6-5.0      L            



             = K)                                                

 

             CHLORIDE (test code = 94 mmol/L    101-111      L            



             CL)                                                 

 

             CARBON DIOXIDE (test 36 mmol/L    21-31        H            



             code = CO2)                                         

 

             GLUCOSE (test code = 101 mg/dl           H            



             GLU)                                                

 

             BLOOD UREA NITROGEN 43 mg/dl     6-20         H            



             (test code = BUN)                                        

 

             GLOMERULAR FILTRATION 24           >60          L            The es

timated



             RATE (test code =                                        glomerular

 filtration



             GFR)                                                rate is compute

d



                                                                 usingpatient ra

ce, age



                                                                 (>18), sex, and

 serum



                                                                 creatinine. If 

anyof



                                                                 the needed data



                                                                 elements are mi

ssing



                                                                 the Laboratory 

cannot



                                                                 compute an ale

mation



                                                                 of the glomerul

ar



                                                                 filtration rate

.

 

             CREATININE (test code 2.20 mg/dL   0.44-1.03    H            



             = CREAT)                                            

 

             CALCIUM (test code = 8.6 mg/dL    8.5-10.5     N            



             CA)                                                 



[id number] 04:34:00





             Test Item    Value        Reference Range Interpretation Comments

 

             PHOSPHOROUS (test code = PHOS) 3.5 mg/dl    2.5-4.6      N         

   



[id number] 04:34:00





             Test Item    Value        Reference Range Interpretation Comments

 

             MAGNESIUM (test code = MAG) 2.0 mg/dl    1.8-2.5      N            



CBC W/AUTO [id number] 04:30:00





             Test Item    Value        Reference Range Interpretation Comments

 

             WHITE BLOOD CELL (test code = 7.8 x10 3/uL 3.2-11.5     N          

  



             WBC)                                                

 

             RED BLOOD CELL (test code = 3.63 x10(6)/m 3.70-5.10    L           

 



             RBC)                                                

 

             HEMOGLOBIN (test code = HGB) 9.8 g/dL     12.0-15.0    L           

 

 

             HEMATOCRIT (test code = HCT) 30.4 %       35.7-44.8    L           

 

 

             MEAN CELL VOLUME (test code = 84 fL               N          

  



             MCV)                                                

 

             MEAN CELL HGB (test code = MCH) 27.0 pg      26.2-33.8    N        

    

 

             MEAN CELL HGB CONCENTRATION 32.2 g/dL    30.0-34.0    N            



             (test code = MCHC)                                        

 

             RED CELL DISTRIBUTION WIDTH 12.4 %       11.3-14.5    N            



             (test code = RDW)                                        

 

             PLATELET COUNT (test code = 435 x10 3/uL 130-408      H            



             PLT)                                                

 

             MEAN PLATELET VOLUME (test code 8.9 fL       8.6-12.6     N        

    



             = MPV)                                              



WBC [id number] 04:30:00





             Test Item    Value        Reference Range Interpretation Comments

 

             TOTAL CELLS COUNTED (test code = 100 #CELLS                        

     



             TCC)                                                

 

             SEGMENTED NEUTROPHILS (test code 52 %         43-65        N       

     



             = SEG)                                              

 

             LYMPHOCYTE (test code = LYMPH) 32 %         20.5-45.5    N         

   

 

             MONOCYTE (test code = MON) 11 %         5.5-11.7     N            

 

             METAMYELOCYTE (test code = META) 1 %          0-0          H       

     

 

             MYELOCYTE (test code = MYELO) 4 %          0-0          H          

  

 

             BAND ABSOLUTE (test code = 0.00 10 3/uL 0.00-0.70    N            



             BAND#)                                              

 

             NEUTROPHIL ABSOLUTE (test code = 4.06 10 3/uL 1.6-7.2      N       

     



             SEG#)                                               

 

             LYMPH ABSOLUTE (test code = 2.5 10 3/uL  1.1-4.8      N            



             LYMPH#)                                             

 

             ATYPICAL LYMPH ABSOLUTE (test 0.00 10 3/uL 0.00-0.00    N          

  



             code = ALYMPH#)                                        

 

             MONOCYTE ABSOLUTE (test code = 0.85 10 3/uL 0.3-0.8      H         

   



             MON#)                                               

 

             BASOPHIL ABSOLUTE (test code = 0.00 10 3/uL 0.00-0.1     N         

   



             BASO#)                                              

 

             EOSINOPHIL ABSOLUTE (test code = 0.00 10 3/uL 0.00-0.50    N       

     



             EOS#)                                               

 

             METAMYELOCYTE ABSOLUTE (test 0.08 10 3/uL 0.00-0.00    H           

 



             code = META#)                                        

 

             MYELOCYTE ABSOLUTE (test code = 0.31 10 3/uL 0.00-0.00    H        

    



             MYELO#)                                             

 

             PROMYELOCYTE ABSOLUTE (test code 0.00 10 3/uL 0.00-0.00    N       

     



             = PROM#)                                            

 

             BLASTS ABSOLUTE (test code = 0.00 10 3/uL 0.00-0.00    N           

 



             BLAST#)                                             

 

             OTHER CELLS ABSOLUTE (test code 0.00 10 3/uL 0.00-0.00    N        

    



             = OCT#)                                             

 

             RBC MORPHOLOGY COMMENT (test Normal       NORMAL                   

 



             code = MOC)                                         

 

             PLATELET MORPHOLOGY (test code = Normal       NORMAL               

     



             PLTMORPH)                                           



CBC W/AUTO [id number] 04:14:00





             Test Item    Value        Reference Range Interpretation Comments

 

             WHITE BLOOD CELL (test code = 7.8 x10 3/uL 3.2-11.5     N          

  



             WBC)                                                

 

             RED BLOOD CELL (test code = 3.63 x10(6)/m 3.70-5.10    L           

 



             RBC)                                                

 

             HEMOGLOBIN (test code = HGB) 9.8 g/dL     12.0-15.0    L           

 

 

             HEMATOCRIT (test code = HCT) 30.4 %       35.7-44.8    L           

 

 

             MEAN CELL VOLUME (test code = 84 fL               N          

  



             MCV)                                                

 

             MEAN CELL HGB (test code = MCH) 27.0 pg      26.2-33.8    N        

    

 

             MEAN CELL HGB CONCENTRATION 32.2 g/dL    30.0-34.0    N            



             (test code = MCHC)                                        

 

             RED CELL DISTRIBUTION WIDTH 12.4 %       11.3-14.5    N            



             (test code = RDW)                                        

 

             PLATELET COUNT (test code = 435 x10 3/uL 130-408      H            



             PLT)                                                

 

             MEAN PLATELET VOLUME (test code 8.9 fL       8.6-12.6     N        

    



             = MPV)                                              



WBC [id number] 04:14:00





             Test Item    Value        Reference Range Interpretation Comments

 

             TOTAL CELLS COUNTED (test code = TCC)  #CELLS                      

          

 

             RBC MORPHOLOGY COMMENT (test code =              NORMAL            

        



             MOC)                                                

 

             PLATELET MORPHOLOGY (test code =              NORMAL               

     



             PLTMORPH)                                           



CBC W/AUTO [id number] 04:14:00





             Test Item    Value        Reference Range Interpretation Comments

 

             WHITE BLOOD CELL (test code = 7.8 x10 3/uL 3.2-11.5     N          

  



             WBC)                                                

 

             RED BLOOD CELL (test code = 3.63 x10(6)/m 3.70-5.10    L           

 



             RBC)                                                

 

             HEMOGLOBIN (test code = HGB) 9.8 g/dL     12.0-15.0    L           

 

 

             HEMATOCRIT (test code = HCT) 30.4 %       35.7-44.8    L           

 

 

             MEAN CELL VOLUME (test code = 84 fL               N          

  



             MCV)                                                

 

             MEAN CELL HGB (test code = MCH) 27.0 pg      26.2-33.8    N        

    

 

             MEAN CELL HGB CONCENTRATION 32.2 g/dL    30.0-34.0    N            



             (test code = MCHC)                                        

 

             RED CELL DISTRIBUTION WIDTH 12.4 %       11.3-14.5    N            



             (test code = RDW)                                        

 

             PLATELET COUNT (test code = 435 x10 3/uL 130-408      H            



             PLT)                                                

 

             MEAN PLATELET VOLUME (test code 8.9 fL       8.6-12.6     N        

    



             = MPV)                                              



WBC [id number] 04:14:00





             Test Item    Value        Reference Range Interpretation Comments

 

             TOTAL CELLS COUNTED (test code = TCC)  #CELLS                      

          

 

             RBC MORPHOLOGY COMMENT (test code =              NORMAL            

        



             MOC)                                                

 

             PLATELET MORPHOLOGY (test code =              NORMAL               

     



             PLTMORPH)                                           



ARTERIAL BLOOD [id number] 04:13:00





             Test Item    Value        Reference Range Interpretation Comments

 

             ARTERIAL BLOOD GAS PH 7.522        7.35-7.45    H            



             (test code = PHA)                                        

 

             ARTERIAL BLOOD GAS 41.5 mmHg    35-45        N            



             PCO2 (test code =                                        



             PCO2A)                                              

 

             ARTERIAL BLOOD GAS 91.2 mmHg           N            



             PO2 (test code =                                        



             PO2A)                                               

 

             BICARBONATE TOTAL 33.3 mmol/L  22-26        H            



             HCO3 (test code =                                        



             HCO3)                                               

 

             BASE EXCESS (test 9.6 mmol/L   See_Comment  H             [Automate

d message]



             code = TINA)                                         The system [employer]



                                                                 generated this 

result



                                                                 transmitted ref

erence



                                                                 range: (+/-)2.0

. The



                                                                 reference range

 was



                                                                 not used to int

erpret



                                                                 this result as



                                                                 normal/abnormal

.

 

             ABG O2 SATURATION 96.8 %       95.0-100.0   N            



             (test code = SATA)                                        

 

             ABG TYPE (test code = Arterial                               



             TYPEA)                                              

 

             ARTERIAL FIO2 (test 40.0 %                                 



             code = FIO2A)                                        

 

             ABG VENT MODE (test PC                                     



             code = MODEA)                                        

 

             ABG VENT RESP RATE 22 /MIN                                



             (test code = RRA)                                        

 

             ABG PEEP (test code = 8.0 cmH2O                              



             PEEP)                                               

 

             ABG SITE (test code = Left Radial                            



             SITEA)                                              

 

             ALLENS TEST (test Yes                                    



             code = ALLENS)                                        

 

             TOTAL HGB (test code 11.1 g/dL    12.0-16.0    L            



             = THB)                                              



ARTERIAL BLOOD [id number] 04:13:00





             Test Item    Value        Reference Range Interpretation Comments

 

             ARTERIAL BLOOD GAS PH 7.522        7.35-7.45    H            



             (test code = PHA)                                        

 

             ARTERIAL BLOOD GAS 41.5 mmHg    35-45        N            



             PCO2 (test code =                                        



             PCO2A)                                              

 

             ARTERIAL BLOOD GAS 91.2 mmHg           N            



             PO2 (test code =                                        



             PO2A)                                               

 

             BICARBONATE TOTAL 33.3 mmol/L  22-26        H            



             HCO3 (test code =                                        



             HCO3)                                               

 

             BASE EXCESS (test 9.6 mmol/L   See_Comment  H             [Automate

d message]



             code = TINA)                                         The system [employer]



                                                                 generated this 

result



                                                                 transmitted ref

erence



                                                                 range: (+/-)2.0

. The



                                                                 reference range

 was



                                                                 not used to int

erpret



                                                                 this result as



                                                                 normal/abnormal

.

 

             ABG O2 SATURATION 96.8 %       95.0-100.0   N            



             (test code = SATA)                                        

 

             ABG TYPE (test code = Arterial                               



             TYPEA)                                              

 

             ARTERIAL FIO2 (test 40.0 %                                 



             code = FIO2A)                                        

 

             ABG VENT MODE (test PC                                     



             code = MODEA)                                        

 

             ABG VENT RESP RATE 22 /MIN                                



             (test code = RRA)                                        

 

             ABG PEEP (test code = 8.0 cmH2O                              



             PEEP)                                               

 

             ABG SITE (test code = Left Radial                            



             SITEA)                                              

 

             ALLENS TEST (test Yes                                    



             code = ALLENS)                                        

 

             TOTAL HGB (test code 11.1 g/dL    12.0-16.0    L            



             = THB)                                              



[id number] 16:54:00





             Test Item    Value        Reference Range Interpretation Comments

 

             GLUBED (test code = GLUBED) 101 MG/DL           N            



[id number] 13:27:00





             Test Item    Value        Reference Range Interpretation Comments

 

             POTASSIUM (test code 2.9 mmol/L   3.6-5.0      LL           Critica

l Value



             = K)                                                reported toFirs

t



                                                                 Name:SAURABH DEONDRE

ast



                                                                 Name:RORO

JUANJOSE



                                                                 READ BACK AND



                                                                 VERIFIEDby KATIA REYNOLDS,



                                                                 on 21, @ 

7576.



[id number] 12:45:00





             Test Item    Value        Reference Range Interpretation Comments

 

             GLUBED (test code = GLUBED) 119 MG/DL           H            



UR SODIUM [id number] 11:42:00





             Test Item    Value        Reference Range Interpretation Comments

 

             UR SODIUM RANDOM 99 mmol/L                              No Normal R

maryse



             (test code = LOGAN)                                        establishe

d.



UR CHLORIDE [id number] 11:42:00





             Test Item    Value        Reference Range Interpretation Comments

 

             UR CHLORIDE RANDOM (test code = 115 mmol/L          N        

    



             CLU)                                                



UR CREATININE [id number] 11:42:00





             Test Item    Value        Reference Range Interpretation Comments

 

             UR CREATININE RANDOM (test code = 20.69 mg/dL         L      

      



             CREATU)                                             



- XR CHEST 1 -26-39 11:08:00
********************************[facility] : 1965 Sex: 
F************************************************************PatientName: 
GAIL VELÁZQUEZ Unit No: [id number] EXAMS:  CPT: [id number] XR CHEST 1 V [id number] 
CHEST, 1 VIEW, AP PORTABLE: HISTORY: Expiratory failure, amlodipine overdose 
COMPARISON: Chest x-ray dated 2021 FINDINGS: The tip of the ET tube is 4.9 
cm above the ramya. An enteric tube is seen crossing the chest to enter the 
stomach although the tip is not imaged epidural electrodes overlie the thoracic 
spine. Central vascular congestion is again noted relatively unchanged over 
2021. There is diffuse haziness in both hemithoraces, especially on the 
right suggesting layering pleural effusions. The cardiac silhouette is normal in
size. No pneumothorax. IMPRESSION: No significant interval change is 2021. C
entral vascular congestion and bilateral pleural effusions. ** Electronically 
Signed by Saeid Newman MD on 2021 at 1108 ** Reported and signed by: 
Saeid Newman MD CC: Cande Cordova MD; Yesenia Alexander MD Technologist: 
GONZALEZ Sommer Time: DAP (Gy m2): Air Kerma (mGy): Trscr Dt/Tm: 2021 
(1108) by:[email or web address] Orig Print D/T: S: 2021 (1112)  BATCH NO: N/A Name: 
GAIL VELÁZQUEZ [facility] Phys: SUNNY.Erika - Emerita Alexander Ms,  [facility] : 1965 Age: 56 Sex: F [place], [place] [address] Acct No: [id number] 
Loc: N.2 1 Exam Date: 2021 Status: ADM IN PH: FAX:  PAGE 1 Signed 
[id number] 06:26:00





             Test Item    Value        Reference Range Interpretation Comments

 

             GLUBED (test code = GLUBED) 91 MG/DL            N            



BASIC METABOLIC [id number] 03:31:00





             Test Item    Value        Reference Range Interpretation Comments

 

             SODIUM (test code = 137 mmol/L   135-145      N            



             NA)                                                 

 

             POTASSIUM (test code 2.7 mmol/L   3.6-5.0      LL           Critica

l Value



             = K)                                                reported toFirs

t



                                                                 Name:EMMANUEL 

Last



                                                                 Name:HUMBERTO

RESULTS



                                                                 READ BACK AND



                                                                 VERIFIEDby KATIA SMITH,



                                                                 on 21, @ 

8161.

 

             CHLORIDE (test code = 93 mmol/L    101-111      L            



             CL)                                                 

 

             CARBON DIOXIDE (test 31 mmol/L    21-31        N            



             code = CO2)                                         

 

             GLUCOSE (test code = 101 mg/dl           H            



             GLU)                                                

 

             BLOOD UREA NITROGEN 40 mg/dl     6-20         H            



             (test code = BUN)                                        

 

             GLOMERULAR FILTRATION 26           >60          L            The es

timated



             RATE (test code =                                        glomerular

 filtration



             GFR)                                                rate is compute

d



                                                                 usingpatient ra

ce, age



                                                                 (>18), sex, and

 serum



                                                                 creatinine. If 

anyof



                                                                 the needed data



                                                                 elements are mi

ssing



                                                                 the Laboratory 

cannot



                                                                 compute an ale

mation



                                                                 of the glomerul

ar



                                                                 filtration rate

.

 

             CREATININE (test code 2.11 mg/dL   0.44-1.03    H            



             = CREAT)                                            

 

             CALCIUM (test code = 7.9 mg/dL    8.5-10.5     L            



             CA)                                                 



[id number] 03:31:00





             Test Item    Value        Reference Range Interpretation Comments

 

             PHOSPHOROUS (test code = PHOS) 3.5 mg/dl    2.5-4.6      N         

   



[id number] 03:31:00





             Test Item    Value        Reference Range Interpretation Comments

 

             MAGNESIUM (test code = MAG) 1.9 mg/dl    1.8-2.5      N            



VANCOMYCIN [id number] 03:21:00





             Test Item    Value        Reference Range Interpretation Comments

 

             VANCOMYCIN TROUGH 13.8 ug/ml   10.0-20.0    N            Please ref

er to



             (test code = VANCT)                                        Medicati

on



                                                                 Administration 

Record



                                                                 (MAR) forlast d

ose date



                                                                 and time.



CALCIUM [id number] 03:19:00





             Test Item    Value        Reference Range Interpretation Comments

 

             CALCIUM IONIZED (test code = SUZANNE) 1.16 mmol/L  1.13-1.32    N      

      



CBC W/AUTO [id number] 03:09:00





             Test Item    Value        Reference Range Interpretation Comments

 

             WHITE BLOOD CELL (test code = 8.5 x10 3/uL 3.2-11.5     N          

  



             WBC)                                                

 

             RED BLOOD CELL (test code = 3.43 x10(6)/m 3.70-5.10    L           

 



             RBC)                                                

 

             HEMOGLOBIN (test code = HGB) 9.5 g/dL     12.0-15.0    L           

 

 

             HEMATOCRIT (test code = HCT) 27.7 %       35.7-44.8    L           

 

 

             MEAN CELL VOLUME (test code = 81 fL               N          

  



             MCV)                                                

 

             MEAN CELL HGB (test code = MCH) 27.7 pg      26.2-33.8    N        

    

 

             MEAN CELL HGB CONCENTRATION 34.3 g/dL    30.0-34.0    H            



             (test code = MCHC)                                        

 

             RED CELL DISTRIBUTION WIDTH 12.0 %       11.3-14.5    N            



             (test code = RDW)                                        

 

             PLATELET COUNT (test code = 376 x10 3/uL 130-408      N            



             PLT)                                                

 

             MEAN PLATELET VOLUME (test code 8.8 fL       8.6-12.6     N        

    



             = MPV)                                              

 

             NEUTROPHIL % (test code = NT%) 72.9 %       40.0-70.0    H         

   

 

             IMMATURE GRANULOCYTE % (test 1.6 %        0.0-2.0      N           

 



             code = IG%)                                         

 

             LYMPHOCYTE % (test code = LY%) 9.6 %        20-40        L         

   

 

             MONOCYTE % (test code = MO%) 8.4 %        1-10         N           

 

 

             EOSINOPHIL % (test code = EO%) 7.0 %        0.0-5.0      H         

   

 

             BASOPHIL % (test code = BA%) 0.5 %        0.0-1.0      N           

 

 

             NUCLEATED RBC % (test code = 0.0 %        0.0-0.9      N           

 



             NRBC%)                                              

 

             NEUTROPHIL # (test code = NT#) 6.2 x10 3/uL 1.6-7.2      N         

   

 

             LYMPHOCYTE # (test code = LY#) 0.82 x10 3/uL 1.1-2.7      L        

    

 

             MONOCYTE # (test code = MO#) 0.7 x10 3/uL 0.3-0.8      N           

 

 

             EOSINOPHIL # (test code = EO#) 0.6 x10 3/uL 0.0-0.5      H         

   

 

             BASOPHIL # (test code = BA#) 0.0 x10 3/uL 0.0-0.1      N           

 



ARTERIAL BLOOD [id number] 02:26:00





             Test Item    Value        Reference Range Interpretation Comments

 

             ARTERIAL BLOOD GAS PH 7.421        7.35-7.45    N            



             (test code = PHA)                                        

 

             ARTERIAL BLOOD GAS 50.2 mmHg    35-45        H            



             PCO2 (test code =                                        



             PCO2A)                                              

 

             ARTERIAL BLOOD .2 mmHg          H            



             PO2 (test code =                                        



             PO2A)                                               

 

             BICARBONATE TOTAL 31.9 mmol/L  22-26        H            



             HCO3 (test code =                                        



             HCO3)                                               

 

             BASE EXCESS (test 6.5 mmol/L   See_Comment  H             [Automate

d message]



             code = TINA)                                         The system [employer]



                                                                 generated this 

result



                                                                 transmitted ref

erence



                                                                 range: (+/-)2.0

. The



                                                                 reference range

 was



                                                                 not used to int

erpret



                                                                 this result as



                                                                 normal/abnormal

.

 

             ABG O2 SATURATION 97.5 %       95.0-100.0   N            



             (test code = SATA)                                        

 

             ABG TYPE (test code = Arterial                               



             TYPEA)                                              

 

             ARTERIAL FIO2 (test 40.0 %                                 



             code = FIO2A)                                        

 

             ABG VENT MODE (test PC                                     



             code = MODEA)                                        

 

             ABG VENT RESP RATE 22 /MIN                                



             (test code = RRA)                                        

 

             ABG PEEP (test code = 12.0 cmH2O                             



             PEEP)                                               

 

             ABG SITE (test code = Left Radial                            



             SITEA)                                              

 

             ALLENS TEST (test Yes                                    



             code = ALLENS)                                        

 

             TOTAL HGB (test code 10.6 g/dL    12.0-16.0    L            



             = THB)                                              



ARTERIAL BLOOD [id number] 02:26:00





             Test Item    Value        Reference Range Interpretation Comments

 

             ARTERIAL BLOOD GAS PH 7.421        7.35-7.45    N            



             (test code = PHA)                                        

 

             ARTERIAL BLOOD GAS 50.2 mmHg    35-45        H            



             PCO2 (test code =                                        



             PCO2A)                                              

 

             ARTERIAL BLOOD .2 mmHg          H            



             PO2 (test code =                                        



             PO2A)                                               

 

             BICARBONATE TOTAL 31.9 mmol/L  22-26        H            



             HCO3 (test code =                                        



             HCO3)                                               

 

             BASE EXCESS (test 6.5 mmol/L   See_Comment  H             [Automate

d message]



             code = TINA)                                         The system [employer]



                                                                 generated this 

result



                                                                 transmitted ref

erence



                                                                 range: (+/-)2.0

. The



                                                                 reference range

 was



                                                                 not used to int

erpret



                                                                 this result as



                                                                 normal/abnormal

.

 

             ABG O2 SATURATION 97.5 %       95.0-100.0   N            



             (test code = SATA)                                        

 

             ABG TYPE (test code = Arterial                               



             TYPEA)                                              

 

             ARTERIAL FIO2 (test 40.0 %                                 



             code = FIO2A)                                        

 

             ABG VENT MODE (test PC                                     



             code = MODEA)                                        

 

             ABG VENT RESP RATE 22 /MIN                                



             (test code = RRA)                                        

 

             ABG PEEP (test code = 12.0 cmH2O                             



             PEEP)                                               

 

             ABG SITE (test code = Left Radial                            



             SITEA)                                              

 

             ALLENS TEST (test Yes                                    



             code = ALLENS)                                        

 

             TOTAL HGB (test code 10.6 g/dL    12.0-16.0    L            



             = THB)                                              



[id number] 00:29:00





             Test Item    Value        Reference Range Interpretation Comments

 

             GLUBED (test code = GLUBED) 104 MG/DL           N            



BASIC METABOLIC [id number] 21:04:00





             Test Item    Value        Reference Range Interpretation Comments

 

             SODIUM (test code = 138 mmol/L   135-145      N            



             NA)                                                 

 

             POTASSIUM (test code 2.6 mmol/L   3.6-5.0      LL           Critica

l Value



             = K)                                                reported toFirs

t



                                                                 Name:KEMAL oshea



                                                                 Name:OMKAR FUNEZ READ



                                                                 BACK AND ROSA ROMEROLAB.LAC, on



                                                                 21, @ 210

4.

 

             CHLORIDE (test code = 96 mmol/L    101-111      L            



             CL)                                                 

 

             CARBON DIOXIDE (test 30 mmol/L    21-31        N            



             code = CO2)                                         

 

             GLUCOSE (test code = 113 mg/dl           H            



             GLU)                                                

 

             BLOOD UREA NITROGEN 42 mg/dl     6-20         H            



             (test code = BUN)                                        

 

             GLOMERULAR FILTRATION 23           >60          L            The es

timated



             RATE (test code =                                        glomerular

 filtration



             GFR)                                                rate is compute

d



                                                                 usingpatient ra

ce, age



                                                                 (>18), sex, and

 serum



                                                                 creatinine. If 

anyof



                                                                 the needed data



                                                                 elements are mi

ssing



                                                                 the Laboratory 

cannot



                                                                 compute an ale

mation



                                                                 of the glomerul

ar



                                                                 filtration rate

.

 

             CREATININE (test code 2.33 mg/dL   0.44-1.03    H            



             = CREAT)                                            

 

             CALCIUM (test code = 8.3 mg/dL    8.5-10.5     L            



             CA)                                                 



[id number] 17:09:00





             Test Item    Value        Reference Range Interpretation Comments

 

             GLUBED (test code = GLUBED) 121 MG/DL           H            



- US RETROPERITONEAL [id number] 15:22:00
********************************[facility]****[facility] : 1965 Sex: 
F************************************************************PatientName: 
GAIL VELÁZQUEZ Unit No: [id number] EXAMS:  CPT: [id number] US RETROPERITONEAL COM 
[id number] EXAM: - US RETROPERITONEAL COM CLINICAL HISTORY: JARRETT TECHNIQUE: Multiple 
grayscale sonographic images of the kidneys. COMPARISON: None FINDINGS: Right 
kidney: Length 11.9 cm. Normal echogenicity with cortical thickness 1 cm. No 
hydronephrosis. Left kidney: Length 11.8 cm. Normal echogenicity with cortical 
thickness 0.74 cm. No hydronephrosis. Urinary bladder: Poorly visualized, 
presumably collapse. Visualized liver: Increased echogenicity which may be seen 
with hepatic steatosis. Visualized right pleura: Partially seen right pleural 
effusion. IMPRESSION: 1. No hydronephrosis or suspicious renal finding. 2. Pa
rtially visualized right pleural effusion. ** Electronically Signed by DELIA LIN MD on 2021 at 1522 ** Reported and signed by: DELIA LIN MD CC: 
Cande Cordova MD; Rayshawn Lin MD  Technologist: Marina ROMERO 
Probe: Trscr Dt/Tm: 2021 (1522) by:Jj Orig Print D/T: S: 2021
(1526) BATCH NO: N/A Name: GAIL VELÁZQUEZ  [facility] Phys: [id number] - 
Rayshawn Lin  [facility] : 1965 Age: 56 Sex: F [place], [place]
[address]  Acct No: [id number] Loc: N.2042 1 Exam Date: 2021 Status: ADM IN 
PH: FAX: PAGE 1 Signed ReportURINALYSIS [id number] 13:23:00





             Test Item    Value        Reference Range Interpretation Comments

 

             UA COLOR (test code = Straw        YELLOW                    



             COLU)                                               

 

             UA APPEARANCE (test HAZY         CLEAR                     



             code = APPU)                                        

 

             UA GLUCOSE DIPSTICK NEGATIVE     NEGATIVE                  



             (test code = DGLUU)                                        

 

             UA BILIRUBIN DIPSTICK NEGATIVE     NEGATIVE                  



             (test code = BILU)                                        

 

             UA KETONE DIPSTICK NEGATIVE     NEGATIVE                  



             (test code = KETU)                                        

 

             UA SPECIFIC GRAVITY 1.006        1.001-1.030               



             (test code = SGU)                                        

 

             UA BLOOD DIPSTICK (test NEGATIVE     NEGATIVE                  



             code = CASSI)                                         

 

             UA PH DIPSTICK (test 5.0          5.0-9.0                   



             code = VANNESSA)                                         

 

             UA PROTEIN DIPSTICK NEGATIVE     NEGATIVE                  



             (test code = PROU)                                        

 

             UA UROBILINOGEN NEGATIVE     See_Comment                [Automated 

message]



             DIPSTICK (test code =                                        The sy

stem which



             URO)                                                generated this 

result



                                                                 transmitted ref

erence



                                                                 range: <=1.0. T

he



                                                                 reference range

 was



                                                                 not used to int

erpret



                                                                 this result as



                                                                 normal/abnormal

.

 

             UA NITRITE DIPSTICK NEGATIVE     NEGATIVE                  



             (test code = TERRI)                                        

 

             UA ASCORBIC ACID NEGATIVE                               



             DIPSTICK (test code =                                        



             AAU)                                                

 

             UA LEUKOCYTE ESTERASE NEGATIVE     NEGATIVE                  



             DIPSTICK (test code =                                        



             LEUU)                                               

 

             UA WBC (test code = 0-5 /HPF     0-5                       



             WBCU)                                               

 

             UA RBC (test code = 0-5 /HPF     0-5                       



             RBCU)                                               

 

             UA EPITHELIAL CELLS RARE /LPF    NONE-FEW                  



             (test code = EPIU)                                        

 

             UA BACTERIA (test code None /HPF    NONE SEEN                 



             = BACU)                                             

 

             UA HYALINE CAST (test 6-10 /LPF    0-1          A            



             code = HYALU)                                        

 

             UA MUCUS (test code = 1+ /LPF      NONE SEEN                 



             MUCU)                                               



[id number] 12:02:00





             Test Item    Value        Reference Range Interpretation Comments

 

             GLUBED (test code = GLUBED) 114 MG/DL           H            



- XR CHEST [id number] -18-81 10:29:00
************************************************************[facility] : 1965 Sex: 
F************************************************************PatientName: 
GAIL VELÁZQUEZ Unit No: [id number]  EXAMS: CPT: [id number] XR CHEST 1 V 51911 
History: Respiratory failure Portable AP chest compared to 2021: No interval
change in endotracheal tube, nasogastric tube. Slight interval worsening of 
pulmonary vascular congestion..  No interval change in the bilateral diffuse 
pulmonary opacities. IMPRESSION: Worsening vascular congestion. ** 
Electronically Signedby Anish Adams MD ** ** on 2021 at 1029 ** 
Reported and signed by: Anish Adams MD CC: Cande Cordova MD; Ms Emerita Alexander MD Technologist: Reshma SANTOS  Fluoro Time: DAP (Gy m2):Air Kerma 
(mGy): Trscr Dt/Tm: 2021 (1029) by:Jane Orig Print D/T: S: 2021
(1032)  BATCH NO: N/A Name: GAIL VELÁZQUEZ [facility] Phys: [id number] - 
Emerita Alexander Ms,  [facility] : 1965 Age: 56 Sex: F [place], [place] [address] Acct No: [id number] Loc: N.2042 1 Exam Date: 2021 Status: 
ADM IN PH: FAX: PAGE 1 Signed [id number] 05:45:00





             Test Item    Value        Reference Range Interpretation Comments

 

             GLUBED (test code = GLUBED) 96 MG/DL            N            



CALCIUM [id number] 05:37:00





             Test Item    Value        Reference Range Interpretation Comments

 

             CALCIUM IONIZED (test code = SUZANNE) 1.19 mmol/L  1.13-1.32    N      

      



BASIC METABOLIC [id number] 03:52:00





             Test Item    Value        Reference Range Interpretation Comments

 

             SODIUM (test code = 135 mmol/L   135-145      N            



             NA)                                                 

 

             POTASSIUM (test code 3.0 mmol/L   3.6-5.0      L            



             = K)                                                

 

             CHLORIDE (test code = 95 mmol/L    101-111      L            



             CL)                                                 

 

             CARBON DIOXIDE (test 27 mmol/L    21-31        N            



             code = CO2)                                         

 

             GLUCOSE (test code = 96 mg/dl            N            



             GLU)                                                

 

             BLOOD UREA NITROGEN 37 mg/dl     6-20         H            



             (test code = BUN)                                        

 

             GLOMERULAR FILTRATION 22           >60          L            The es

timated



             RATE (test code =                                        glomerular

 filtration



             GFR)                                                rate is compute

d



                                                                 usingpatient ra

ce, age



                                                                 (>18), sex, and

 serum



                                                                 creatinine. If 

anyof



                                                                 the needed data



                                                                 elements are mi

ssing



                                                                 the Laboratory 

cannot



                                                                 compute an ale

mation



                                                                 of the glomerul

ar



                                                                 filtration rate

.

 

             CREATININE (test code 2.38 mg/dL   0.44-1.03    H            



             = CREAT)                                            

 

             CALCIUM (test code = 8.3 mg/dL    8.5-10.5     L            



             CA)                                                 



[id number] 03:52:00





             Test Item    Value        Reference Range Interpretation Comments

 

             PHOSPHOROUS (test code = PHOS) 3.6 mg/dl    2.5-4.6      N         

   



[id number] 03:52:00





             Test Item    Value        Reference Range Interpretation Comments

 

             MAGNESIUM (test code = MAG) 2.3 mg/dl    1.8-2.5      N            



CBC W/AUTO [id number] 03:39:00





             Test Item    Value        Reference Range Interpretation Comments

 

             WHITE BLOOD CELL (test code = 10.4 x10 3/uL 3.2-11.5     N         

   



             WBC)                                                

 

             RED BLOOD CELL (test code = 3.07 x10(6)/m 3.70-5.10    L           

 



             RBC)                                                

 

             HEMOGLOBIN (test code = HGB) 8.4 g/dL     12.0-15.0    L           

 

 

             HEMATOCRIT (test code = HCT) 25.6 %       35.7-44.8    L           

 

 

             MEAN CELL VOLUME (test code = 83 fL               N          

  



             MCV)                                                

 

             MEAN CELL HGB (test code = MCH) 27.4 pg      26.2-33.8    N        

    

 

             MEAN CELL HGB CONCENTRATION 32.8 g/dL    30.0-34.0    N            



             (test code = MCHC)                                        

 

             RED CELL DISTRIBUTION WIDTH 11.9 %       11.3-14.5    N            



             (test code = RDW)                                        

 

             PLATELET COUNT (test code = 280 x10 3/uL 130-408      N            



             PLT)                                                

 

             MEAN PLATELET VOLUME (test code 9.0 fL       8.6-12.6     N        

    



             = MPV)                                              

 

             NEUTROPHIL % (test code = NT%) 79.5 %       40.0-70.0    H         

   

 

             IMMATURE GRANULOCYTE % (test 0.9 %        0.0-2.0      N           

 



             code = IG%)                                         

 

             LYMPHOCYTE % (test code = LY%) 8.0 %        20-40        L         

   

 

             MONOCYTE % (test code = MO%) 5.0 %        1-10         N           

 

 

             EOSINOPHIL % (test code = EO%) 6.3 %        0.0-5.0      H         

   

 

             BASOPHIL % (test code = BA%) 0.3 %        0.0-1.0      N           

 

 

             NUCLEATED RBC % (test code = 0.0 %        0.0-0.9      N           

 



             NRBC%)                                              

 

             NEUTROPHIL # (test code = NT#) 8.3 x10 3/uL 1.6-7.2      H         

   

 

             LYMPHOCYTE # (test code = LY#) 0.83 x10 3/uL 1.1-2.7      L        

    

 

             MONOCYTE # (test code = MO#) 0.5 x10 3/uL 0.3-0.8      N           

 

 

             EOSINOPHIL # (test code = EO#) 0.7 x10 3/uL 0.0-0.5      H         

   

 

             BASOPHIL # (test code = BA#) 0.0 x10 3/uL 0.0-0.1      N           

 



ARTERIAL BLOOD [id number] 02:39:00





             Test Item    Value        Reference Range Interpretation Comments

 

             ARTERIAL BLOOD GAS PH 7.370        7.35-7.45    N            



             (test code = PHA)                                        

 

             ARTERIAL BLOOD GAS 48.4 mmHg    35-45        H            



             PCO2 (test code =                                        



             PCO2A)                                              

 

             ARTERIAL BLOOD GAS 78.1 mmHg           L            



             PO2 (test code =                                        



             PO2A)                                               

 

             BICARBONATE TOTAL 27.4 mmol/L  22-26        H            



             HCO3 (test code =                                        



             HCO3)                                               

 

             BASE EXCESS (test 1.7 mmol/L   See_Comment  N             [Automate

d message]



             code = TINA)                                         The system [employer]



                                                                 generated this 

result



                                                                 transmitted ref

erence



                                                                 range: (+/-)2.0

. The



                                                                 reference range

 was



                                                                 not used to int

erpret



                                                                 this result as



                                                                 normal/abnormal

.

 

             ABG O2 SATURATION 95.9 %       95.0-100.0   N            



             (test code = SATA)                                        

 

             ABG TYPE (test code = Arterial                               



             TYPEA)                                              

 

             ARTERIAL FIO2 (test 40.0 %                                 



             code = FIO2A)                                        

 

             ABG VENT MODE (test PC                                     



             code = MODEA)                                        

 

             ABG VENT RESP RATE 22 /MIN                                



             (test code = RRA)                                        

 

             ABG PEEP (test code = 14.0 cmH2O                             



             PEEP)                                               

 

             ABG SITE (test code = Left Radial                            



             SITEA)                                              

 

             ALLENS TEST (test Yes                                    



             code = ALLENS)                                        

 

             TOTAL HGB (test code 9.3 g/dL     12.0-16.0    L            



             = THB)                                              



ARTERIAL BLOOD [id number] 02:39:00





             Test Item    Value        Reference Range Interpretation Comments

 

             ARTERIAL BLOOD GAS PH 7.370        7.35-7.45    N            



             (test code = PHA)                                        

 

             ARTERIAL BLOOD GAS 48.4 mmHg    35-45        H            



             PCO2 (test code =                                        



             PCO2A)                                              

 

             ARTERIAL BLOOD GAS 78.1 mmHg           L            



             PO2 (test code =                                        



             PO2A)                                               

 

             BICARBONATE TOTAL 27.4 mmol/L  22-26        H            



             HCO3 (test code =                                        



             HCO3)                                               

 

             BASE EXCESS (test 1.7 mmol/L   See_Comment  N             [Automate

d message]



             code = TINA)                                         The system [employer]



                                                                 generated this 

result



                                                                 transmitted ref

erence



                                                                 range: (+/-)2.0

. The



                                                                 reference range

 was



                                                                 not used to int

erpret



                                                                 this result as



                                                                 normal/abnormal

.

 

             ABG O2 SATURATION 95.9 %       95.0-100.0   N            



             (test code = SATA)                                        

 

             ABG TYPE (test code = Arterial                               



             TYPEA)                                              

 

             ARTERIAL FIO2 (test 40.0 %                                 



             code = FIO2A)                                        

 

             ABG VENT MODE (test PC                                     



             code = MODEA)                                        

 

             ABG VENT RESP RATE 22 /MIN                                



             (test code = RRA)                                        

 

             ABG PEEP (test code = 14.0 cmH2O                             



             PEEP)                                               

 

             ABG SITE (test code = Left Radial                            



             SITEA)                                              

 

             ALLENS TEST (test Yes                                    



             code = ALLENS)                                        

 

             TOTAL HGB (test code 9.3 g/dL     12.0-16.0    L            



             = THB)                                              



BASIC METABOLIC [id number] 21:44:00





             Test Item    Value        Reference Range Interpretation Comments

 

             SODIUM (test code = 134 mmol/L   135-145      L            



             NA)                                                 

 

             POTASSIUM (test code 3.2 mmol/L   3.6-5.0      L            



             = K)                                                

 

             CHLORIDE (test code = 94 mmol/L    101-111      L            



             CL)                                                 

 

             CARBON DIOXIDE (test 25 mmol/L    21-31        N            



             code = CO2)                                         

 

             GLUCOSE (test code = 90 mg/dl            N            



             GLU)                                                

 

             BLOOD UREA NITROGEN 34 mg/dl     6-20         H            



             (test code = BUN)                                        

 

             GLOMERULAR FILTRATION 22           >60          L            The es

timated



             RATE (test code =                                        glomerular

 filtration



             GFR)                                                rate is compute

d



                                                                 usingpatient ra

ce, age



                                                                 (>18), sex, and

 serum



                                                                 creatinine. If 

anyof



                                                                 the needed data



                                                                 elements are mi

ssing



                                                                 the Laboratory 

cannot



                                                                 compute an ale

mation



                                                                 of the glomerul

ar



                                                                 filtration rate

.

 

             CREATININE (test code 2.38 mg/dL   0.44-1.03    H            



             = CREAT)                                            

 

             CALCIUM (test code = 8.3 mg/dL    8.5-10.5     L            



             CA)                                                 



Novel Coronavirus  17:20:00





             Test Item    Value        Reference Range Interpretation Comments

 

             Novel Coronavirus Negative     Negative                  Positive r

esults are



             2019 Inhouse (test                                        indicativ

e of the presence



             code = [id number])                                        ofSARS-CoV

-2 RNA, clinical



                                                                 correlation wit

h patient



                                                                 historyand othe

r



                                                                 diagnostic info

rmation is



                                                                 necessary to



                                                                 determinepatien

t infection



                                                                 status. Positiv

e results



                                                                 do not rule out

bacterial



                                                                 infection or co

-infection



                                                                 with other viru

ses.



                                                                 Negative result

s do not



                                                                 preclude SARS-C

oV-2



                                                                 infection andsh

ould not be



                                                                 used as the sol

e basis for



                                                                 patient



                                                                 managementdecis

ions.



                                                                 Negative result

s must be



                                                                 combined with



                                                                 otherclinical



                                                                 observations, p

atient



                                                                 history, and



                                                                 epidemiological

information



                                                                 . Detection of 

SARS-CoV-2



                                                                 RNA may be affe

cted



                                                                 bysample collec

tion



                                                                 methods, storag

e



                                                                 conditions, and

/or stageof



                                                                 infection. Lorena

l RNA



                                                                 mutations, vacc

inations,



                                                                 antiviraltherap

eutics,



                                                                 antibiotics,



                                                                 chemotherapeuti

c



                                                                 orimmunosuppres

satish drugs



                                                                 have not been e

valuated



                                                                 for effectson d

etection.



                                                                 Results are for

 the



                                                                 identification 

of



                                                                 SARS-CoV-2 RNA



                                                                 usingreal-time 

(RT)



                                                                 polymerase janeth

n reaction



                                                                 (PCR) technolog

yfor the



                                                                 qualitative det

ection of



                                                                 nucleic acids f

rom



                                                                 [id number] v

irus and



                                                                 diagnosis of SA

RS-CoV-2



                                                                 virusinfection.

 It is an



                                                                 Emergency Use



                                                                 Authorization (

EUA)



                                                                 testauthorized 

by the [place]



                                                                 FDA.Specimen So

urce:



                                                                 Nasopharyngeal 

(NP) Swab



First test? NoEmployed in Healthcare? NoSymptomatic as defined by CDC? NoDate of
Symptom Onset: [id number] due to COVID? NoIn ICU due to COVID? 
NoResident in a congregate care setting? NoPregnant? [id number] 11:07:00





             Test Item    Value        Reference Range Interpretation Comments

 

             GLUBED (test code = GLUBED) 90 MG/DL            N            



CALCIUM [id number] 06:56:00





             Test Item    Value        Reference Range Interpretation Comments

 

             CALCIUM IONIZED (test code = SUZANNE) 1.15 mmol/L  1.13-1.32    N      

      



[id number] 06:47:00





             Test Item    Value        Reference Range Interpretation Comments

 

             MAGNESIUM (test code = MAG) 2.3 mg/dl    1.8-2.5      N            



ADD ON- XR CHEST 1 -50-37 06:39:00
********************************[facility]****[facility] : 1965 Sex: 
F************************************************************PatientName: 
GAIL VELÁZQUEZ Unit No: [id number] EXAMS:  CPT: [id number] XR CHEST 1 V [id number] 
Portable chest, 2021. Clinical: Respiratory failure. Comment: The airway and
gastric tubes remain in place. There are bilateral diffuse consolidations. No 
pneumothorax is present. The regional skeleton appears unchanged.  IMPRESSION: 
Interval improvement since 2021. ** Electronically Signed by Fortino Bagley MD
on 2021 at 0639 ** Reported and signed by: Fortino Bagley MD  CC: Yovany Larson NP; Cande Cordova MD  Technologist: Hank Sommer Time: DAP (Gy m2):
Air Kerma (mGy): Trscr Dt/Tm: 2021 (0639)by:[email or web address]  Orig Print D/T: S:
2021 (0642) BATCH NO: N/A [facility]  [place] : 1965 Age: 56 Sex: F [place], [place] 
[address] Acct No: [id number] Loc: N.2042 1  Exam Date: 2021 Status: ADM IN 
PH: FAX: PAGE 1 Signed [id number] 05:19:00





             Test Item    Value        Reference Range Interpretation Comments

 

             CREATININE (test code = CREAT) 1.69 mg/dL   0.44-1.03    H         

   



BASIC METABOLIC [id number] 05:18:00





             Test Item    Value        Reference Range Interpretation Comments

 

             SODIUM (test code = 129 mmol/L   135-145      L            



             NA)                                                 

 

             POTASSIUM (test code 3.1 mmol/L   3.6-5.0      L            



             = K)                                                

 

             CHLORIDE (test code = 88 mmol/L    101-111      L            



             CL)                                                 

 

             CARBON DIOXIDE (test 27 mmol/L    21-31        N            



             code = CO2)                                         

 

             GLUCOSE (test code = 96 mg/dl            N            



             GLU)                                                

 

             BLOOD UREA NITROGEN 23 mg/dl     6-20         H            



             (test code = BUN)                                        

 

             GLOMERULAR FILTRATION 39           >60          L            The es

timated



             RATE (test code =                                        glomerular

 filtration



             GFR)                                                rate is compute

d



                                                                 usingpatient ra

ce, age



                                                                 (>18), sex, and

 serum



                                                                 creatinine. If 

anyof



                                                                 the needed data



                                                                 elements are mi

ssing



                                                                 the Laboratory 

cannot



                                                                 compute an ale

mation



                                                                 of the glomerul

ar



                                                                 filtration rate

.

 

             CREATININE (test code 1.48 mg/dL   0.44-1.03    H            



             = CREAT)                                            

 

             CALCIUM (test code = 8.0 mg/dL    8.5-10.5     L            



             CA)                                                 



[id number] 05:18:00





             Test Item    Value        Reference Range Interpretation Comments

 

             PHOSPHOROUS (test code = PHOS) 3.4 mg/dl    2.5-4.6      N         

   



CBC W/AUTO [id number] 04:59:00





             Test Item    Value        Reference Range Interpretation Comments

 

             WHITE BLOOD CELL (test code = 11.1 x10 3/uL 3.2-11.5     N         

   



             WBC)                                                

 

             RED BLOOD CELL (test code = 3.12 x10(6)/m 3.70-5.10    L           

 



             RBC)                                                

 

             HEMOGLOBIN (test code = HGB) 8.4 g/dL     12.0-15.0    L           

 

 

             HEMATOCRIT (test code = HCT) 26.1 %       35.7-44.8    L           

 

 

             MEAN CELL VOLUME (test code = 84 fL               N          

  



             MCV)                                                

 

             MEAN CELL HGB (test code = MCH) 26.9 pg      26.2-33.8    N        

    

 

             MEAN CELL HGB CONCENTRATION 32.2 g/dL    30.0-34.0    N            



             (test code = MCHC)                                        

 

             RED CELL DISTRIBUTION WIDTH 12.0 %       11.3-14.5    N            



             (test code = RDW)                                        

 

             PLATELET COUNT (test code = 228 x10 3/uL 130-408      N            



             PLT)                                                

 

             MEAN PLATELET VOLUME (test code 9.4 fL       8.6-12.6     N        

    



             = MPV)                                              

 

             NEUTROPHIL % (test code = NT%) 85.2 %       40.0-70.0    H         

   

 

             IMMATURE GRANULOCYTE % (test 1.0 %        0.0-2.0      N           

 



             code = IG%)                                         

 

             LYMPHOCYTE % (test code = LY%) 6.0 %        20-40        L         

   

 

             MONOCYTE % (test code = MO%) 5.1 %        1-10         N           

 

 

             EOSINOPHIL % (test code = EO%) 2.6 %        0.0-5.0      N         

   

 

             BASOPHIL % (test code = BA%) 0.1 %        0.0-1.0      N           

 

 

             NUCLEATED RBC % (test code = 0.0 %        0.0-0.9      N           

 



             NRBC%)                                              

 

             NEUTROPHIL # (test code = NT#) 9.4 x10 3/uL 1.6-7.2      H         

   

 

             LYMPHOCYTE # (test code = LY#) 0.66 x10 3/uL 1.1-2.7      L        

    

 

             MONOCYTE # (test code = MO#) 0.6 x10 3/uL 0.3-0.8      N           

 

 

             EOSINOPHIL # (test code = EO#) 0.3 x10 3/uL 0.0-0.5      N         

   

 

             BASOPHIL # (test code = BA#) 0.0 x10 3/uL 0.0-0.1      N           

 



ARTERIAL BLOOD [id number] 03:54:00





             Test Item    Value        Reference Range Interpretation Comments

 

             ARTERIAL BLOOD GAS PH 7.318        7.35-7.45    L            



             (test code = PHA)                                        

 

             ARTERIAL BLOOD GAS 57.0 mmHg    35-45        H            



             PCO2 (test code =                                        



             PCO2A)                                              

 

             ARTERIAL BLOOD GAS 96.8 mmHg           N            



             PO2 (test code =                                        



             PO2A)                                               

 

             BICARBONATE TOTAL 28.6 mmol/L  22-26        H            



             HCO3 (test code =                                        



             HCO3)                                               

 

             BASE EXCESS (test 1.7 mmol/L   See_Comment  N             [Automate

d message]



             code = TINA)                                         The system [employer]



                                                                 generated this



                                                                 result transmit

maribell



                                                                 reference range

:



                                                                 (+/-)2.0. The



                                                                 reference range

 was



                                                                 not used to



                                                                 interpret this



                                                                 result as



                                                                 normal/abnormal

.

 

             ABG O2 SATURATION 97.1 %       95.0-100.0   N            



             (test code = SATA)                                        

 

             ABG TYPE (test code = Arterial                               



             TYPEA)                                              

 

             ARTERIAL FIO2 (test 80.0 %                                 



             code = FIO2A)                                        

 

             ABG VENT MODE (test pc                                     



             code = MODEA)                                        

 

             ABG PEEP (test code = 14.0 cmH2O                             



             PEEP)                                               

 

             ABG SITE (test code = Right Radial                           



             SITEA)                                              

 

             ALLENS TEST (test Yes                                    



             code = ALLENS)                                        

 

             TOTAL HGB (test code 9.6 g/dL     12.0-16.0    L            



             = THB)                                              



ARTERIAL BLOOD [id number] 03:54:00





             Test Item    Value        Reference Range Interpretation Comments

 

             ARTERIAL BLOOD GAS PH 7.318        7.35-7.45    L            



             (test code = PHA)                                        

 

             ARTERIAL BLOOD GAS 57.0 mmHg    35-45        H            



             PCO2 (test code =                                        



             PCO2A)                                              

 

             ARTERIAL BLOOD GAS 96.8 mmHg           N            



             PO2 (test code =                                        



             PO2A)                                               

 

             BICARBONATE TOTAL 28.6 mmol/L  22-26        H            



             HCO3 (test code =                                        



             HCO3)                                               

 

             BASE EXCESS (test 1.7 mmol/L   See_Comment  N             [Automate

d message]



             code = TINA)                                         The system [employer]



                                                                 generated this



                                                                 result transmit

maribell



                                                                 reference range

:



                                                                 (+/-)2.0. The



                                                                 reference range

 was



                                                                 not used to



                                                                 interpret this



                                                                 result as



                                                                 normal/abnormal

.

 

             ABG O2 SATURATION 97.1 %       95.0-100.0   N            



             (test code = SATA)                                        

 

             ABG TYPE (test code = Arterial                               



             TYPEA)                                              

 

             ARTERIAL FIO2 (test 80.0 %                                 



             code = FIO2A)                                        

 

             ABG VENT MODE (test pc                                     



             code = MODEA)                                        

 

             ABG PEEP (test code = 14.0 cmH2O                             



             PEEP)                                               

 

             ABG SITE (test code = Right Radial                           



             SITEA)                                              

 

             ALLENS TEST (test Yes                                    



             code = ALLENS)                                        

 

             TOTAL HGB (test code 9.6 g/dL     12.0-16.0    L            



             = THB)                                              



ARTERIAL BLOOD [id number] 01:41:00





             Test Item    Value        Reference Range Interpretation Comments

 

             ARTERIAL BLOOD GAS PH 7.314        7.35-7.45    L            



             (test code = PHA)                                        

 

             ARTERIAL BLOOD GAS 56.9 mmHg    35-45        H            



             PCO2 (test code =                                        



             PCO2A)                                              

 

             ARTERIAL BLOOD GAS 84.8 mmHg           N            



             PO2 (test code =                                        



             PO2A)                                               

 

             BICARBONATE TOTAL 28.3 mmol/L  22-26        H            



             HCO3 (test code =                                        



             HCO3)                                               

 

             BASE EXCESS (test 1.0 mmol/L   See_Comment  N             [Automate

d message]



             code = TINA)                                         The system [employer]



                                                                 generated this



                                                                 result transmit

maribell



                                                                 reference range

:



                                                                 (+/-)2.0. The



                                                                 reference range

 was



                                                                 not used to



                                                                 interpret this



                                                                 result as



                                                                 normal/abnormal

.

 

             ABG O2 SATURATION 96.4 %       95.0-100.0   N            



             (test code = SATA)                                        

 

             ABG TYPE (test code = Arterial                               



             TYPEA)                                              

 

             ARTERIAL FIO2 (test 80.0 %                                 



             code = FIO2A)                                        

 

             ABG VENT MODE (test PRVC                                   



             code = MODEA)                                        

 

             ABG SITE (test code = Right Radial                           



             SITEA)                                              

 

             ALLENS TEST (test Yes                                    



             code = ALLENS)                                        

 

             TOTAL HGB (test code 12.6 g/dL    12.0-16.0    N            



             = THB)                                              



ARTERIAL BLOOD [id number] 01:41:00





             Test Item    Value        Reference Range Interpretation Comments

 

             ARTERIAL BLOOD GAS PH 7.314        7.35-7.45    L            



             (test code = PHA)                                        

 

             ARTERIAL BLOOD GAS 56.9 mmHg    35-45        H            



             PCO2 (test code =                                        



             PCO2A)                                              

 

             ARTERIAL BLOOD GAS 84.8 mmHg           N            



             PO2 (test code =                                        



             PO2A)                                               

 

             BICARBONATE TOTAL 28.3 mmol/L  22-26        H            



             HCO3 (test code =                                        



             HCO3)                                               

 

             BASE EXCESS (test 1.0 mmol/L   See_Comment  N             [Automate

d message]



             code = TINA)                                         The system [employer]



                                                                 generated this



                                                                 result transmit

maribell



                                                                 reference range

:



                                                                 (+/-)2.0. The



                                                                 reference range

 was



                                                                 not used to



                                                                 interpret this



                                                                 result as



                                                                 normal/abnormal

.

 

             ABG O2 SATURATION 96.4 %       95.0-100.0   N            



             (test code = SATA)                                        

 

             ABG TYPE (test code = Arterial                               



             TYPEA)                                              

 

             ARTERIAL FIO2 (test 80.0 %                                 



             code = FIO2A)                                        

 

             ABG VENT MODE (test PRVC                                   



             code = MODEA)                                        

 

             ABG SITE (test code = Right Radial                           



             SITEA)                                              

 

             ALLENS TEST (test Yes                                    



             code = ALLENS)                                        

 

             TOTAL HGB (test code 12.6 g/dL    12.0-16.0    N            



             = THB)                                              



[id number] 23:27:00





             Test Item    Value        Reference Range Interpretation Comments

 

             GLUBED (test code = GLUBED) 98 MG/DL            N            



B-TYPE NATRIURETIC [id number] 17:37:00





             Test Item    Value        Reference Range Interpretation Comments

 

             B-TYPE NATRIURETIC PEPTIDE (test 632 pg/ml    0-100        H       

     



             code = BNP)                                         



COVID 19 INHOUSE [id number] 16:44:00





             Test Item    Value        Reference Range Interpretation Comments

 

             COVID 19 INHOUSE AG NEGATIVE     Negative                  Negative

 results should be



             (test code =                                        treated as pres

umptive



             [id number])                                        andconfirmed wi

th a



                                                                 molecular assay

, if



                                                                 necessary for



                                                                 patientmanageme

nt.



                                                                 Negative result

s do not



                                                                 rule out COVID-

19



                                                                 andshould not b

e used as



                                                                 the sole basis 

for



                                                                 treatment orpat

ient



                                                                 management deci

sions,



                                                                 including infec

tion



                                                                 controldecision

s. Negative



                                                                 results should 

be



                                                                 considered in t

hecontext



                                                                 of a patient's 

recent



                                                                 exposures, hist

ory and



                                                                 thepresence of 

clnical



                                                                 signs and sympt

oms



                                                                 consistent



                                                                 withCOVID-19.Sp

ecimen



                                                                 Source: Nasopha

ryngeal



                                                                 (NP) Swab



- XR CHEST 1 -01-92 08:56:00
************************************************************[facility] : 1965 Sex: 
F************************************************************PatientName: 
GAIL VELÁZQUEZ Unit No: [id number] EXAMS:  CPT: [id number] XR CHEST 1 V 41559 CHEST
RADIOGRAPH, ONE VIEW: FRONTAL HISTORY: Shortness of breath.  COMPARISON: 2021. FINDINGS: Tip of the ET tube is 3 cm above the ramya. NG tube is below
hemidiaphragm. Interval worsening of airspace and groundglass opacities in both 
lungs. The cardiac silhouette is normal. IMPRESSION: Interval worsening. **
Electronically Signed by Regino Rosario MD on 2021 at 0856 ** Reported and 
signed by: Regino Rosario MD CC: Vannessa Camarillo DO; Stu Chance MD  Technologist: 
Reshma SANTOS Fluoro Time: DAP (Gy m2): Air Kerma (mGy): Trscr Dt/Tm: 2021 
(0856) by:[email or web address] Orig Print D/T: S: 2021 (0859) BATCH NO: N/A Name: 
GAIL VELÁZQUEZ [facility] Phys: Stu Nicholas  [place] 
: 1965 Age:56 Sex: F  [place], [place] [address] Acct No: [id number] Loc: 
N.2042 1 Exam Date: 2021 Status: ADM IN PH: FAX: PAGE 1  Signed Report- XR
CHEST 1 -80-46 07:16:00
********************************[facility] : 1965 Sex: 
F************************************************************PatientName: 
GAIL VELÁZQUEZ Unit No: [id number] EXAMS: CPT: [id number] XR CHEST 1 V  02308 
STUDY: - XR CHEST 1V INDICATION: For shortness of breath and/or Chest Pain. 
TECHNIQUE: AP view of the chest.  COMPARISON: Chest radiograph from . 
FINDINGS: No mediastinal shift. Normal cardiac silhouette. Diffuse bilateral 
lung mixed interstitial and airspace opacities with relative sparing of the lung
apices, overall about the same compared to 2021 but worse compared to 
2021 likely to represent severe edema perhaps mixed with pneumonia. No 
evidence of pleural effusion or pneumothorax. IMPRESSION: No change compared to 
2021 but mildly worsened compared to 2021.  ** Electronically Signed by 
DELIA LIN MD on 2021 at 0716 ** Reported and signed by: DELIA LIN MD CC: Vannessa Camarillo DO Technologist: Hank Sommer Time: DAP (Gy m2): Air Kerma 
(mGy): Trscr Dt/Tm: 2021 (0716) by:Jj Orig Print D/T: S: 2021
(0719)  BATCH NO: N/A Name: GAIL VELÁZQUEZ [facility] Phys: [facility] -Camarillo,Vannessa DO 
[address] [place]  : 1965 Age: 56 Sex: F [place] [address] Acct No: 
[id number] Loc: N.0656 1 Exam Date: 2021 Status: ADM IN  PH: FAX: PAGE 1
Signed ReportARTERIAL BLOOD [id number] 07:06:00





             Test Item    Value        Reference Range Interpretation Comments

 

             ARTERIAL BLOOD GAS PH 7.408        7.35-7.45    N            



             (test code = PHA)                                        

 

             ARTERIAL BLOOD GAS 47.2 mmHg    35-45        H            



             PCO2 (test code =                                        



             PCO2A)                                              

 

             ARTERIAL BLOOD GAS 55.7 mmHg           L            



             PO2 (test code =                                        



             PO2A)                                               

 

             BICARBONATE TOTAL 29.1 mmol/L  22-26        H            



             HCO3 (test code =                                        



             HCO3)                                               

 

             BASE EXCESS (test 3.8 mmol/L   See_Comment  H             [Automate

d message]



             code = TINA)                                         The system [employer]



                                                                 generated this 

result



                                                                 transmitted ref

erence



                                                                 range: (+/-)2.0

. The



                                                                 reference range

 was



                                                                 not used to int

erpret



                                                                 this result as



                                                                 normal/abnormal

.

 

             ABG O2 SATURATION 91.4 %       95.0-100.0   L            



             (test code = SATA)                                        

 

             ABG TYPE (test code = Arterial                               



             TYPEA)                                              

 

             ARTERIAL FIO2 (test 100.0 %                                



             code = FIO2A)                                        

 

             ABG VENT MODE (test BIPAP                                  



             code = MODEA)                                        

 

             ABG SITE (test code = Left Radial                            



             SITEA)                                              

 

             ALLENS TEST (test Yes                                    



             code = ALLENS)                                        

 

             TOTAL HGB (test code 11.8 g/dL    12.0-16.0    L            



             = THB)                                              



ARTERIAL BLOOD [id number] 07:06:00





             Test Item    Value        Reference Range Interpretation Comments

 

             ARTERIAL BLOOD GAS PH 7.408        7.35-7.45    N            



             (test code = PHA)                                        

 

             ARTERIAL BLOOD GAS 47.2 mmHg    35-45        H            



             PCO2 (test code =                                        



             PCO2A)                                              

 

             ARTERIAL BLOOD GAS 55.7 mmHg           L            



             PO2 (test code =                                        



             PO2A)                                               

 

             BICARBONATE TOTAL 29.1 mmol/L  22-26        H            



             HCO3 (test code =                                        



             HCO3)                                               

 

             BASE EXCESS (test 3.8 mmol/L   See_Comment  H             [Automate

d message]



             code = TINA)                                         The system [employer]



                                                                 generated this 

result



                                                                 transmitted ref

erence



                                                                 range: (+/-)2.0

. The



                                                                 reference range

 was



                                                                 not used to int

erpret



                                                                 this result as



                                                                 normal/abnormal

.

 

             ABG O2 SATURATION 91.4 %       95.0-100.0   L            



             (test code = SATA)                                        

 

             ABG TYPE (test code = Arterial                               



             TYPEA)                                              

 

             ARTERIAL FIO2 (test 100.0 %                                



             code = FIO2A)                                        

 

             ABG VENT MODE (test BIPAP                                  



             code = MODEA)                                        

 

             ABG SITE (test code = Left Radial                            



             SITEA)                                              

 

             ALLENS TEST (test Yes                                    



             code = ALLENS)                                        

 

             TOTAL HGB (test code 11.8 g/dL    12.0-16.0    L            



             = THB)                                              



CBC W/AUTO [id number] 06:51:00





             Test Item    Value        Reference Range Interpretation Comments

 

             WHITE BLOOD CELL (test code = 17.0 x10 3/uL 3.2-11.5     H         

   



             WBC)                                                

 

             RED BLOOD CELL (test code = 3.96 x10(6)/m 3.70-5.10    N           

 



             RBC)                                                

 

             HEMOGLOBIN (test code = HGB) 10.9 g/dL    12.0-15.0    L           

 

 

             HEMATOCRIT (test code = HCT) 32.9 %       35.7-44.8    L           

 

 

             MEAN CELL VOLUME (test code = 83 fL               N          

  



             MCV)                                                

 

             MEAN CELL HGB (test code = MCH) 27.5 pg      26.2-33.8    N        

    

 

             MEAN CELL HGB CONCENTRATION 33.1 g/dL    30.0-34.0    N            



             (test code = MCHC)                                        

 

             RED CELL DISTRIBUTION WIDTH 11.9 %       11.3-14.5    N            



             (test code = RDW)                                        

 

             PLATELET COUNT (test code = 259 x10 3/uL 130-408      N            



             PLT)                                                

 

             MEAN PLATELET VOLUME (test code 9.5 fL       8.6-12.6     N        

    



             = MPV)                                              



WBC [id number] 06:51:00





             Test Item    Value        Reference Range Interpretation Comments

 

             TOTAL CELLS COUNTED (test code 100 #CELLS                          

   



             = TCC)                                              

 

             SEGMENTED NEUTROPHILS (test 91 %         43-65        H            



             code = SEG)                                         

 

             BAND NEUTROPHIL (test code = 3 %          0-1          H           

 



             BAND)                                               

 

             LYMPHOCYTE (test code = LYMPH) 3 %          20.5-45.5    L         

   

 

             MONOCYTE (test code = MON) 2 %          5.5-11.7     L            

 

             MYELOCYTE (test code = MYELO) 1 %          0-0          H          

  

 

             BAND ABSOLUTE (test code = 0.51 10 3/uL 0.00-0.70    N            



             BAND#)                                              

 

             NEUTROPHIL ABSOLUTE (test code 15.47 10 3/uL 1.6-7.2      H        

    



             = SEG#)                                             

 

             LYMPH ABSOLUTE (test code = 0.5 10 3/uL  1.1-4.8      L            



             LYMPH#)                                             

 

             ATYPICAL LYMPH ABSOLUTE (test 0.00 10 3/uL 0.00-0.00    N          

  



             code = ALYMPH#)                                        

 

             MONOCYTE ABSOLUTE (test code = 0.34 10 3/uL 0.3-0.8      N         

   



             MON#)                                               

 

             BASOPHIL ABSOLUTE (test code = 0.00 10 3/uL 0.00-0.1     N         

   



             BASO#)                                              

 

             EOSINOPHIL ABSOLUTE (test code 0.00 10 3/uL 0.00-0.50    N         

   



             = EOS#)                                             

 

             METAMYELOCYTE ABSOLUTE (test 0.00 10 3/uL 0.00-0.00    N           

 



             code = META#)                                        

 

             MYELOCYTE ABSOLUTE (test code = 0.17 10 3/uL 0.00-0.00    H        

    



             MYELO#)                                             

 

             PROMYELOCYTE ABSOLUTE (test 0.00 10 3/uL 0.00-0.00    N            



             code = PROM#)                                        

 

             BLASTS ABSOLUTE (test code = 0.00 10 3/uL 0.00-0.00    N           

 



             BLAST#)                                             

 

             OTHER CELLS ABSOLUTE (test code 0.00 10 3/uL 0.00-0.00    N        

    



             = OCT#)                                             

 

             POLYCHROMASIA (test code = 1+           NONE SEEN    A            



             POLC)                                               

 

             RBC MORPHOLOGY COMMENT (test See comment  NORMAL                   

 



             code = MOC)                                         

 

             PLATELET MORPHOLOGY (test code Normal       NORMAL                 

   



             = PLTMORPH)                                         



[id number] 06:41:00





             Test Item    Value        Reference Range Interpretation Comments

 

             PHOSPHOROUS (test code = PHOS) 2.7 mg/dl    2.5-4.6      N         

   



CALCIUM [id number] 06:28:00





             Test Item    Value        Reference Range Interpretation Comments

 

             CALCIUM IONIZED (test code = SUZANNE) 1.15 mmol/L  1.13-1.32    N      

      



CBC W/AUTO [id number] 06:14:00





             Test Item    Value        Reference Range Interpretation Comments

 

             WHITE BLOOD CELL (test code = 17.0 x10 3/uL 3.2-11.5     H         

   



             WBC)                                                

 

             RED BLOOD CELL (test code = 3.96 x10(6)/m 3.70-5.10    N           

 



             RBC)                                                

 

             HEMOGLOBIN (test code = HGB) 10.9 g/dL    12.0-15.0    L           

 

 

             HEMATOCRIT (test code = HCT) 32.9 %       35.7-44.8    L           

 

 

             MEAN CELL VOLUME (test code = 83 fL               N          

  



             MCV)                                                

 

             MEAN CELL HGB (test code = MCH) 27.5 pg      26.2-33.8    N        

    

 

             MEAN CELL HGB CONCENTRATION 33.1 g/dL    30.0-34.0    N            



             (test code = MCHC)                                        

 

             RED CELL DISTRIBUTION WIDTH 11.9 %       11.3-14.5    N            



             (test code = RDW)                                        

 

             PLATELET COUNT (test code = 259 x10 3/uL 130-408      N            



             PLT)                                                

 

             MEAN PLATELET VOLUME (test code 9.5 fL       8.6-12.6     N        

    



             = MPV)                                              



WBC [id number] 06:14:00





             Test Item    Value        Reference Range Interpretation Comments

 

             TOTAL CELLS COUNTED (test code = TCC)  #CELLS                      

          

 

             RBC MORPHOLOGY COMMENT (test code =              NORMAL            

        



             MOC)                                                

 

             PLATELET MORPHOLOGY (test code =              NORMAL               

     



             PLTMORPH)                                           



CBC W/AUTO [id number] 06:14:00





             Test Item    Value        Reference Range Interpretation Comments

 

             WHITE BLOOD CELL (test code = 17.0 x10 3/uL 3.2-11.5     H         

   



             WBC)                                                

 

             RED BLOOD CELL (test code = 3.96 x10(6)/m 3.70-5.10    N           

 



             RBC)                                                

 

             HEMOGLOBIN (test code = HGB) 10.9 g/dL    12.0-15.0    L           

 

 

             HEMATOCRIT (test code = HCT) 32.9 %       35.7-44.8    L           

 

 

             MEAN CELL VOLUME (test code = 83 fL               N          

  



             MCV)                                                

 

             MEAN CELL HGB (test code = MCH) 27.5 pg      26.2-33.8    N        

    

 

             MEAN CELL HGB CONCENTRATION 33.1 g/dL    30.0-34.0    N            



             (test code = MCHC)                                        

 

             RED CELL DISTRIBUTION WIDTH 11.9 %       11.3-14.5    N            



             (test code = RDW)                                        

 

             PLATELET COUNT (test code = 259 x10 3/uL 130-408      N            



             PLT)                                                

 

             MEAN PLATELET VOLUME (test code 9.5 fL       8.6-12.6     N        

    



             = MPV)                                              



WBC [id number] 06:14:00





             Test Item    Value        Reference Range Interpretation Comments

 

             TOTAL CELLS COUNTED (test code = TCC)  #CELLS                      

          

 

             RBC MORPHOLOGY COMMENT (test code =              NORMAL            

        



             MOC)                                                

 

             PLATELET MORPHOLOGY (test code =              NORMAL               

     



             PLTMORPH)                                           



[id number] 06:04:00





             Test Item    Value        Reference Range Interpretation Comments

 

             CREATININE (test code = CREAT) 0.96 mg/dL   0.44-1.03    N         

   



[id number] 05:54:00





             Test Item    Value        Reference Range Interpretation Comments

 

             GLUBED (test code = GLUBED) 117 MG/DL           H            



[id number] 20:58:00





             Test Item    Value        Reference Range Interpretation Comments

 

             GLUBED (test code = GLUBED) 133 MG/DL           H            



[id number] 16:30:00





             Test Item    Value        Reference Range Interpretation Comments

 

             GLUBED (test code = GLUBED) 124 MG/DL           H            



- XR CHEST 1 -95-94 13:15:00
************************************************************[facility] : 1965 Sex: 
F************************************************************PatientName: 
GAIL VELÁZQUEZ Unit No: [id number] EXAMS: CPT: [id number] XR CHEST 1 V 15325 EXAM: 
SINGLE AP VIEWOF THE CHEST HISTORY: Shortness of breath. COMPARISON: Chest x-ray
2021. FINDINGS: Lines/tubes: Partially evaluated neurostimulator projects 
over the mediastinum. Lungs/Pleura: Diffuse bilateral airspace opacities with 
prominence of the pulmonary vasculature. Small residual right-sided pleural effu
parth. The left costophrenic angle is clear. No pneumothorax. Heart/mediastinum: 
Cardiomediastinal silhouette is within normal limits. Bones/soft tissues: Within
normal limits. IMPRESSION: Persistent moderate pulmonary vascular congestion. 
Small residual right-sided pleural effusion. ** Electronically Signed by LELE HONG MD on 2021 at 1315 ** Reported and signed by: LELE HONG MD CC: 
Vannessa Sanders; Fatou Mary  Technologist: Katherine Sommer Time: DAP (Gy m2):
Air Kerma (mGy): Trscr Dt/Tm: 2021 (1315) by:[email or web address] Orig Print D/T: S: 
2021 (5713)  BATCH NO: N/A Name: GAIL VELÁZQUEZ [facility] Phys: Fatou Hernandez [address] [facility] : 1965 Age: 56 Sex: F [place] [address] Acct No: [id number] Loc: N.0656 1 Exam Date: 2021 Status: 
ADM IN PH: FAX:  PAGE 1 Signed [id number] 11:46:00





             Test Item    Value        Reference Range Interpretation Comments

 

             GLUBED (test code = GLUBED) 108 MG/DL           H            



CALCIUM [id number] 06:40:00





             Test Item    Value        Reference Range Interpretation Comments

 

             CALCIUM IONIZED (test code = SUZANNE) 1.15 mmol/L  1.13-1.32    N      

      



[id number] 06:12:00





             Test Item    Value        Reference Range Interpretation Comments

 

             GLUBED (test code = GLUBED) 103 MG/DL           N            



COMPREHENSIVE METABOLIC [id number] 06:09:00





             Test Item    Value        Reference Range Interpretation Comments

 

             SODIUM (test code = 132 mmol/L   135-145      L            



             NA)                                                 

 

             POTASSIUM (test 3.6 mmol/L   3.6-5.0      N            



             code = K)                                           

 

             CHLORIDE (test code 95 mmol/L    101-111      L            



             = CL)                                               

 

             CARBON DIOXIDE 25 mmol/L    21-31        N            



             (test code = CO2)                                        

 

             GLUCOSE (test code 116 mg/dl           H            



             = GLU)                                              

 

             BLOOD UREA NITROGEN 14 mg/dl     6-20         N            



             (test code = BUN)                                        

 

             GLOMERULAR   >=60 max     >60                       The estimated



             FILTRATION RATE estimate                               glomerular



             (test code = GFR)                                        filtration

 rate is



                                                                 computed



                                                                 usingpatient ra

ce,



                                                                 age (>18), sex,

 and



                                                                 serum creatinin

e.



                                                                 If anyof the ne

eded



                                                                 data elements a

re



                                                                 missing the



                                                                 Laboratory kalpana

ot



                                                                 compute an



                                                                 estimation of t

he



                                                                 glomerular



                                                                 filtration rate

.

 

             CREATININE (test 0.76 mg/dL   0.44-1.03    N            



             code = CREAT)                                        

 

             TOTAL PROTEIN (test 5.7 g/dL     6.7-8.2      L            



             code = PROT)                                        

 

             ALBUMIN (test code 2.9 g/dL     3.2-5.5      L            



             = ALB)                                              

 

             CALCIUM (test code 8.2 mg/dL    8.5-10.5     L            



             = CA)                                               

 

             BILIRUBIN TOTAL 1.00 mg/dL   0.2-1.3      N            



             (test code = BILT)                                        

 

             SGOT/AST (test code 27 U/L       10-42        N            



             = AST)                                              

 

             SGPT/ALT (test code 45 U/L       10-60        N            



             = ALT)                                              

 

             ALKALINE     68 U/L              N            



             PHOSPHATASE (test                                        



             code = ALKP)                                        



[id number] 06:09:00





             Test Item    Value        Reference Range Interpretation Comments

 

             PHOSPHOROUS (test code = PHOS) 3.1 mg/dl    2.5-4.6      N         

   



CBC W/AUTO [id number] 06:07:00





             Test Item    Value        Reference Range Interpretation Comments

 

             WHITE BLOOD CELL (test code = 16.4 x10 3/uL 3.2-11.5     H         

   



             WBC)                                                

 

             RED BLOOD CELL (test code = 3.79 x10(6)/m 3.70-5.10    N           

 



             RBC)                                                

 

             HEMOGLOBIN (test code = HGB) 10.4 g/dL    12.0-15.0    L           

 

 

             HEMATOCRIT (test code = HCT) 31.2 %       35.7-44.8    L           

 

 

             MEAN CELL VOLUME (test code = 82 fL               N          

  



             MCV)                                                

 

             MEAN CELL HGB (test code = MCH) 27.4 pg      26.2-33.8    N        

    

 

             MEAN CELL HGB CONCENTRATION 33.3 g/dL    30.0-34.0    N            



             (test code = MCHC)                                        

 

             RED CELL DISTRIBUTION WIDTH 11.9 %       11.3-14.5    N            



             (test code = RDW)                                        

 

             PLATELET COUNT (test code = 219 x10 3/uL 130-408      N            



             PLT)                                                

 

             MEAN PLATELET VOLUME (test code 9.2 fL       8.6-12.6     N        

    



             = MPV)                                              

 

             NEUTROPHIL % (test code = NT%) 87.1 %       40.0-70.0    H         

   

 

             IMMATURE GRANULOCYTE % (test 1.1 %        0.0-2.0      N           

 



             code = IG%)                                         

 

             LYMPHOCYTE % (test code = LY%) 6.4 %        20-40        L         

   

 

             MONOCYTE % (test code = MO%) 5.1 %        1-10         N           

 

 

             EOSINOPHIL % (test code = EO%) 0.1 %        0.0-5.0      N         

   

 

             BASOPHIL % (test code = BA%) 0.2 %        0.0-1.0      N           

 

 

             NUCLEATED RBC % (test code = 0.0 %        0.0-0.9      N           

 



             NRBC%)                                              

 

             NEUTROPHIL # (test code = NT#) 14.2 x10 3/uL 1.6-7.2      H        

    

 

             LYMPHOCYTE # (test code = LY#) 1.05 x10 3/uL 1.1-2.7      L        

    

 

             MONOCYTE # (test code = MO#) 0.8 x10 3/uL 0.3-0.8      N           

 

 

             EOSINOPHIL # (test code = EO#) 0.0 x10 3/uL 0.0-0.5      N         

   

 

             BASOPHIL # (test code = BA#) 0.0 x10 3/uL 0.0-0.1      N           

 



COMPREHENSIVE METABOLIC [id number] 06:06:00





             Test Item    Value        Reference Range Interpretation Comments

 

             SODIUM (test code = 132 mmol/L   135-145      L            



             NA)                                                 

 

             POTASSIUM (test 3.6 mmol/L   3.6-5.0      N            



             code = K)                                           

 

             CHLORIDE (test code 95 mmol/L    101-111      L            



             = CL)                                               

 

             CARBON DIOXIDE 25 mmol/L    21-31        N            



             (test code = CO2)                                        

 

             GLUCOSE (test code 116 mg/dl           H            



             = GLU)                                              

 

             BLOOD UREA NITROGEN 14 mg/dl     6-20         N            



             (test code = BUN)                                        

 

             GLOMERULAR   >=60 max     >60                       The estimated



             FILTRATION RATE estimate                               glomerular



             (test code = GFR)                                        filtration

 rate is



                                                                 computed



                                                                 usingpatient ra

ce,



                                                                 age (>18), sex,

 and



                                                                 serum creatinin

e.



                                                                 If anyof the ne

eded



                                                                 data elements a

re



                                                                 missing the



                                                                 Laboratory [facility]

ot



                                                                 compute an



                                                                 estimation of t

he



                                                                 glomerular



                                                                 filtration rate

.

 

             CREATININE (test 0.76 mg/dL   0.44-1.03    N            



             code = CREAT)                                        

 

             TOTAL PROTEIN (test  g/dL        6.7-8.2                   



             code = PROT)                                        

 

             ALBUMIN (test code  g/dL        3.2-5.5                   



             = ALB)                                              

 

             CALCIUM (test code 8.2 mg/dL    8.5-10.5     L            



             = CA)                                               

 

             BILIRUBIN TOTAL  mg/dL       0.2-1.3                   



             (test code = BILT)                                        

 

             SGOT/AST (test code  U/L         10-42                     



             = AST)                                              

 

             SGPT/ALT (test code  U/L         10-60                     



             = ALT)                                              

 

             ALKALINE      U/L                             



             PHOSPHATASE (test                                        



             code = ALKP)                                        



[id number] 06:06:00





             Test Item    Value        Reference Range Interpretation Comments

 

             PHOSPHOROUS (test code = PHOS)  mg/dl       2.5-4.6                

   



[id number] 21:20:00





             Test Item    Value        Reference Range Interpretation Comments

 

             GLUBED (test code = GLUBED) 114 MG/DL           H            



[id number] 16:08:00





             Test Item    Value        Reference Range Interpretation Comments

 

             GLUBED (test code = GLUBED) 131 MG/DL           H            



[id number] 13:10:00





             Test Item    Value        Reference Range Interpretation Comments

 

             GLUBED (test code = GLUBED) 117 MG/DL           H            



CBC W/AUTO [id number] 06:14:00





             Test Item    Value        Reference Range Interpretation Comments

 

             WHITE BLOOD CELL (test code = 17.3 x10 3/uL 3.2-11.5     H         

   



             WBC)                                                

 

             RED BLOOD CELL (test code = 4.00 x10(6)/m 3.70-5.10    N           

 



             RBC)                                                

 

             HEMOGLOBIN (test code = HGB) 11.1 g/dL    12.0-15.0    L           

 

 

             HEMATOCRIT (test code = HCT) 32.8 %       35.7-44.8    L           

 

 

             MEAN CELL VOLUME (test code = 82 fL               N          

  



             MCV)                                                

 

             MEAN CELL HGB (test code = MCH) 27.8 pg      26.2-33.8    N        

    

 

             MEAN CELL HGB CONCENTRATION 33.8 g/dL    30.0-34.0    N            



             (test code = MCHC)                                        

 

             RED CELL DISTRIBUTION WIDTH 12.3 %       11.3-14.5    N            



             (test code = RDW)                                        

 

             PLATELET COUNT (test code = 180 x10 3/uL 130-408      N            



             PLT)                                                

 

             MEAN PLATELET VOLUME (test code 10.7 fL      8.6-12.6     N        

    



             = MPV)                                              

 

             NEUTROPHIL % (test code = NT%) 80.4 %       40.0-70.0    H         

   

 

             IMMATURE GRANULOCYTE % (test 1.0 %        0.0-2.0      N           

 



             code = IG%)                                         

 

             LYMPHOCYTE % (test code = LY%) 10.2 %       20-40        L         

   

 

             MONOCYTE % (test code = MO%) 8.1 %        1-10         N           

 

 

             EOSINOPHIL % (test code = EO%) 0.0 %        0.0-5.0      N         

   

 

             BASOPHIL % (test code = BA%) 0.3 %        0.0-1.0      N           

 

 

             NUCLEATED RBC % (test code = 0.0 %        0.0-0.9      N           

 



             NRBC%)                                              

 

             NEUTROPHIL # (test code = NT#) 13.9 x10 3/uL 1.6-7.2      H        

    

 

             LYMPHOCYTE # (test code = LY#) 1.76 x10 3/uL 1.1-2.7      N        

    

 

             MONOCYTE # (test code = MO#) 1.4 x10 3/uL 0.3-0.8      H           

 

 

             EOSINOPHIL # (test code = EO#) 0.0 x10 3/uL 0.0-0.5      N         

   

 

             BASOPHIL # (test code = BA#) 0.1 x10 3/uL 0.0-0.1      N           

 



COMPREHENSIVE METABOLIC [id number] 05:56:00





             Test Item    Value        Reference Range Interpretation Comments

 

             SODIUM (test code = 134 mmol/L   135-145      L            



             NA)                                                 

 

             POTASSIUM (test 3.2 mmol/L   3.6-5.0      L            



             code = K)                                           

 

             CHLORIDE (test code 100 mmol/L   101-111      L            



             = CL)                                               

 

             CARBON DIOXIDE 22 mmol/L    21-31        N            



             (test code = CO2)                                        

 

             GLUCOSE (test code 108 mg/dl           H            



             = GLU)                                              

 

             BLOOD UREA NITROGEN 21 mg/dl     6-20         H            



             (test code = BUN)                                        

 

             GLOMERULAR   >=60 max     >60                       The estimated



             FILTRATION RATE estimate                               glomerular



             (test code = GFR)                                        filtration

 rate is



                                                                 computed



                                                                 usingpatient ra

ce,



                                                                 age (>18), sex,

 and



                                                                 serum creatinin

e.



                                                                 If anyof the ne

eded



                                                                 data elements a

re



                                                                 missing the



                                                                 Laboratory [facility]

ot



                                                                 compute an



                                                                 estimation of t

he



                                                                 glomerular



                                                                 filtration rate

.

 

             CREATININE (test 0.83 mg/dL   0.44-1.03    N            



             code = CREAT)                                        

 

             TOTAL PROTEIN (test 5.3 g/dL     6.7-8.2      L            



             code = PROT)                                        

 

             ALBUMIN (test code 3.0 g/dL     3.2-5.5      L            



             = ALB)                                              

 

             CALCIUM (test code 8.0 mg/dL    8.5-10.5     L            



             = CA)                                               

 

             BILIRUBIN TOTAL 0.90 mg/dL   0.2-1.3      N            



             (test code = BILT)                                        

 

             SGOT/AST (test code 36 U/L       10-42        N            



             = AST)                                              

 

             SGPT/ALT (test code 57 U/L       10-60        N            



             = ALT)                                              

 

             ALKALINE     66 U/L              N            



             PHOSPHATASE (test                                        



             code = ALKP)                                        



CREATINE KINASE (CK)2021 05:56:00





             Test Item    Value        Reference Range Interpretation Comments

 

             CREATINE KINASE (CK) (test 431 U/L      0-210        HH           P

REVIOUSLY CALLED.



             code = CK)                                          



[id number] 05:36:00





             Test Item    Value        Reference Range Interpretation Comments

 

             GLUBED (test code = GLUBED) 117 MG/DL           H            



LIVER FUNCTION [id number] 13:35:00





             Test Item    Value        Reference Range Interpretation Comments

 

             TOTAL PROTEIN (test code = PROT) 5.7 g/dL     6.7-8.2      L       

     

 

             ALBUMIN (test code = ALB) 3.3 g/dL     3.2-5.5      N            

 

             BILIRUBIN TOTAL (test code = BILT) 0.50 mg/dL   0.2-1.3      N     

       

 

             BILIRUBIN DIRECT (test code = 0.1 mg/dL    0.00-0.20    N          

  



             BILD)                                               

 

             SGOT/AST (test code = AST) 43 U/L       10-42        H            

 

             SGPT/ALT (test code = ALT) 72 U/L       10-60        H            

 

             ALKALINE PHOSPHATASE (test code = 75 U/L              N      

      



             ALKP)                                               



LACTIC [id number] 13:34:00





             Test Item    Value        Reference Range Interpretation Comments

 

             LACTIC ACID (test code = LACT) 1.9 mmol/L   0.5-2.0      N         

   



- XR CHEST 1 -23-79 12:59:00
********************************[facility] : 1965 Sex: 
F************************************************************PatientName: 
GAIL VELÁZQUEZ Unit No: [id number] EXAMS: CPT: [id number] XR CHEST 1 V 19601 CHEST 
RADIOGRAPH, ONE VIEW: FRONTAL HISTORY: Shortness of breath. COMPARISON: . FINDINGS: Interval developmentof extensive vascular congestion and 
interstitial edema and small right pleural effusion. The cardiac silhouette is 
magnified. IMPRESSION: Extensive vascular congestion and inserted a mass. Small 
rightpleural effusion. ** Electronically Signed by Regino Rosario MD on 2021 at
1259 ** Reported and signed by: Regino Rosario MD  CC: Elsie Rodrigues MD 
Technologist: Reshma Sommer Time: DAP (Gy m2): Air Kerma (mGy): Trscr 
Dt/Tm: 2021 (7919) by:[email or web address] Orig Print D/T: S: 2021 (1302) BATCH
NO: N/A Name: GAIL VELÁZQUEZ [facility] Phys: Elsie Godinez MD 
[address] [facility] : 1965 Age: 56 Sex: [place] [address] Acct No: 
[id number] Loc: N.ERMI 2 Exam Date: 2021 Status: ADM IN PH: FAX: PAGE 1 
Signed ReportCOMPREHENSIVE METABOLIC [id number] 07:47:00





             Test Item    Value        Reference Range Interpretation Comments

 

             SODIUM (test code = 130 mmol/L   135-145      L            



             NA)                                                 

 

             POTASSIUM (test code 3.6 mmol/L   3.6-5.0      N            



             = K)                                                

 

             CHLORIDE (test code = 100 mmol/L   101-111      L            



             CL)                                                 

 

             CARBON DIOXIDE (test 20 mmol/L    21-31        L            



             code = CO2)                                         

 

             GLUCOSE (test code = 139 mg/dl           H            



             GLU)                                                

 

             BLOOD UREA NITROGEN 25 mg/dl     6-20         H            



             (test code = BUN)                                        

 

             GLOMERULAR FILTRATION 58           >60          L            The es

timated



             RATE (test code =                                        glomerular

 filtration



             GFR)                                                rate is compute

d



                                                                 usingpatient ra

ce, age



                                                                 (>18), sex, and

 serum



                                                                 creatinine. If 

anyof



                                                                 the needed data



                                                                 elements are mi

ssing



                                                                 the Laboratory 

cannot



                                                                 compute an ale

mation



                                                                 of the glomerul

ar



                                                                 filtration rate

.

 

             CREATININE (test code 1.04 mg/dL   0.44-1.03    H            



             = CREAT)                                            

 

             TOTAL PROTEIN (test 5.6 g/dL     6.7-8.2      L            



             code = PROT)                                        

 

             ALBUMIN (test code = 3.2 g/dL     3.2-5.5      N            



             ALB)                                                

 

             CALCIUM (test code = 8.4 mg/dL    8.5-10.5     L            



             CA)                                                 

 

             BILIRUBIN TOTAL (test 0.60 mg/dL   0.2-1.3      N            



             code = BILT)                                        

 

             SGOT/AST (test code = 46 U/L       10-42        H            



             AST)                                                

 

             SGPT/ALT (test code = 76 U/L       10-60        H            



             ALT)                                                

 

             ALKALINE PHOSPHATASE 73 U/L              N            



             (test code = ALKP)                                        



Spec Comments: Repeat Serum Magnesium level in AM if not alreadyComments to 
Phleb: ordered.Comments to Phleb: Repeat Serum K level in AM if not already 
ordered.[id number] 07:47:00





             Test Item    Value        Reference Range Interpretation Comments

 

             PHOSPHOROUS (test code = PHOS) 3.6 mg/dl    2.5-4.6      N         

   



Spec Comments: Repeat Serum Magnesium level in AM if not alreadyComments to 
Phleb: ordered.Comments to Phleb: Repeat Serum K level in AM if not already 
ordered.CREATINE KINASE (CK)2021 07:47:00





             Test Item    Value        Reference Range Interpretation Comments

 

             CREATINE KINASE (CK) 396 U/L      0-210        HH           Critica

l Value reported



             (test code = CK)                                        toFirst Nam

e:CEIIGA Last



                                                                 Name:COTY 

RESULTS



                                                                 READ BACK AND V

Susan ROMEROLAB.PD, on , @



                                                                 0724.



Spec Comments: Repeat Serum Magnesium level in AM if not alreadyComments to 
Phleb: ordered.Comments to Phleb: Repeat Serum K level in AM if not  already 
ordered.[id number] 07:47:00





             Test Item    Value        Reference Range Interpretation Comments

 

             MAGNESIUM (test code = MAG) 2.1 mg/dl    1.8-2.5      N            



Spec Comments: Repeat Serum Magnesium level in AM if not alreadyComments to 
Phleb: ordered.Comments to Phleb: Repeat Serum K level in AM if not already 
ordered.CALCIUM [id number] 07:34:00





             Test Item    Value        Reference Range Interpretation Comments

 

             CALCIUM IONIZED (test code = SUZANNE) 1.18 mmol/L  1.13-1.32    N      

      



CBC W/AUTO [id number] 07:26:00





             Test Item    Value        Reference Range Interpretation Comments

 

             WHITE BLOOD CELL (test code = 21.1 x10 3/uL 3.2-11.5     H         

   



             WBC)                                                

 

             RED BLOOD CELL (test code = 4.57 x10(6)/m 3.70-5.10    N           

 



             RBC)                                                

 

             HEMOGLOBIN (test code = HGB) 12.7 g/dL    12.0-15.0                

 

 

             HEMATOCRIT (test code = HCT) 39.6 %       35.7-44.8    N           

 

 

             MEAN CELL VOLUME (test code = 87 fL               N          

  



             MCV)                                                

 

             MEAN CELL HGB (test code = MCH) 27.8 pg      26.2-33.8    N        

    

 

             MEAN CELL HGB CONCENTRATION 32.1 g/dL    30.0-34.0    N            



             (test code = MCHC)                                        

 

             RED CELL DISTRIBUTION WIDTH 12.8 %       11.3-14.5    N            



             (test code = RDW)                                        

 

             PLATELET COUNT (test code = 302 x10 3/uL 130-408      N            



             PLT)                                                

 

             MEAN PLATELET VOLUME (test code 8.9 fL       8.6-12.6     N        

    



             = MPV)                                              

 

             NEUTROPHIL % (test code = NT%) 84.8 %       40.0-70.0    H         

   

 

             IMMATURE GRANULOCYTE % (test 0.8 %        0.0-2.0      N           

 



             code = IG%)                                         

 

             LYMPHOCYTE % (test code = LY%) 8.3 %        20-40        L         

   

 

             MONOCYTE % (test code = MO%) 5.8 %        1-10         N           

 

 

             EOSINOPHIL % (test code = EO%) 0.0 %        0.0-5.0      N         

   

 

             BASOPHIL % (test code = BA%) 0.3 %        0.0-1.0      N           

 

 

             NUCLEATED RBC % (test code = 0.0 %        0.0-0.9      N           

 



             NRBC%)                                              

 

             NEUTROPHIL # (test code = NT#) 17.9 x10 3/uL 1.6-7.2      H        

    

 

             LYMPHOCYTE # (test code = LY#) 1.76 x10 3/uL 1.1-2.7      N        

    

 

             MONOCYTE # (test code = MO#) 1.2 x10 3/uL 0.3-0.8      H           

 

 

             EOSINOPHIL # (test code = EO#) 0.0 x10 3/uL 0.0-0.5      N         

   

 

             BASOPHIL # (test code = BA#) 0.1 x10 3/uL 0.0-0.1      N           

 



[id number] 02:24:00





             Test Item    Value        Reference Range Interpretation Comments

 

             TROPONIN-I (test code = TROPI) 0.033 ng/mL  0.000-0.034  N         

   



LACTIC [id number] 02:19:00





             Test Item    Value        Reference Range Interpretation Comments

 

             LACTIC ACID (test 2.7 mmol/L   0.5-2.0      HH           Critical V

alue reported



             code = LACT)                                        toFirst Name:MA

PORFIRIO Last



                                                                 Name:DIANA SOW READ



                                                                 BACK AND ROSA ROMEROLAB.MOM, on 0

21,



                                                                 @ 5645.



BASIC METABOLIC [id number] 02:14:00





             Test Item    Value        Reference Range Interpretation Comments

 

             SODIUM (test code = 131 mmol/L   135-145      L            



             NA)                                                 

 

             POTASSIUM (test code 4.0 mmol/L   3.6-5.0      N            



             = K)                                                

 

             CHLORIDE (test code = 101 mmol/L   101-111      N            



             CL)                                                 

 

             CARBON DIOXIDE (test 18 mmol/L    21-31        L            



             code = CO2)                                         

 

             GLUCOSE (test code = 148 mg/dl           H            



             GLU)                                                

 

             BLOOD UREA NITROGEN 25 mg/dl     6-20         H            



             (test code = BUN)                                        

 

             GLOMERULAR FILTRATION 52           >60          L            The es

timated



             RATE (test code =                                        glomerular

 filtration



             GFR)                                                rate is compute

d



                                                                 usingpatient ra

ce, age



                                                                 (>18), sex, and

 serum



                                                                 creatinine. If 

anyof



                                                                 the needed data



                                                                 elements are mi

ssing



                                                                 the Laboratory 

cannot



                                                                 compute an ale

mation



                                                                 of the glomerul

ar



                                                                 filtration rate

.

 

             CREATININE (test code 1.15 mg/dL   0.44-1.03    H            



             = CREAT)                                            

 

             CALCIUM (test code = 8.4 mg/dL    8.5-10.5     L            



             CA)                                                 



[id number] 21:47:00





             Test Item    Value        Reference Range Interpretation Comments

 

             GLUBED (test code = GLUBED) 164 MG/DL           H            



HGBA1C - GLYCOSYLATED [id number] 20:29:00





             Test Item    Value        Reference Range Interpretation Comments

 

             GLYCOSYLATED HEMOGLOBIN 5.4 %        4.0-6.0      N            Inte

rpretive Data:



             (HA1C) (test code =                                        Caution 

should be



             GLYHGB)                                             exercised when



                                                                 interpreting th

e HgbA1c



                                                                 in patients wit

h



                                                                 hemolytic anemi

a, iron



                                                                 deficiency and 

when the



                                                                 total hemoglobi

n is <



                                                                 9g/dL, due to a

 decrease



                                                                 in average age 

of red



                                                                 blood cells



BASIC METABOLIC [id number] 19:00:00





             Test Item    Value        Reference Range Interpretation Comments

 

             SODIUM (test code = 132 mmol/L   135-145      L            



             NA)                                                 

 

             POTASSIUM (test code 3.8 mmol/L   3.6-5.0      N            



             = K)                                                

 

             CHLORIDE (test code = 101 mmol/L   101-111      N            



             CL)                                                 

 

             CARBON DIOXIDE (test 15 mmol/L    21-31        L            



             code = CO2)                                         

 

             GLUCOSE (test code = 156 mg/dl           H            



             GLU)                                                

 

             BLOOD UREA NITROGEN 24 mg/dl     6-20         H            



             (test code = BUN)                                        

 

             GLOMERULAR FILTRATION 32           >60          L            The es

timated



             RATE (test code =                                        glomerular

 filtration



             GFR)                                                rate is compute

d



                                                                 usingpatient ra

ce, age



                                                                 (>18), sex, and

 serum



                                                                 creatinine. If 

anyof



                                                                 the needed data



                                                                 elements are mi

ssing



                                                                 the Laboratory 

cannot



                                                                 compute an ale

mation



                                                                 of the glomerul

ar



                                                                 filtration rate

.

 

             CREATININE (test code 1.73 mg/dL   0.44-1.03    H            



             = CREAT)                                            

 

             CALCIUM (test code = 9.2 mg/dL    8.5-10.5     N            



             CA)                                                 



LIVER FUNCTION [id number] 19:00:00





             Test Item    Value        Reference Range Interpretation Comments

 

             TOTAL PROTEIN (test code = PROT) 5.9 g/dL     6.7-8.2      L       

     

 

             ALBUMIN (test code = ALB) 3.4 g/dL     3.2-5.5      N            

 

             BILIRUBIN TOTAL (test code = BILT) 0.50 mg/dL   0.2-1.3      N     

       

 

             BILIRUBIN DIRECT (test code = 0.1 mg/dL    0.00-0.20    N          

  



             BILD)                                               

 

             SGOT/AST (test code = AST) 78 U/L       10-42        H            

 

             SGPT/ALT (test code = ALT) 100 U/L      10-60        H            

 

             ALKALINE PHOSPHATASE (test code = 82 U/L              N      

      



             ALKP)                                               



CREATINE KINASE (CK)2021 19:00:00





             Test Item    Value        Reference Range Interpretation Comments

 

             CREATINE KINASE (CK) (test code = CK) 113 U/L      0-210        N  

          



[id number] 19:00:00





             Test Item    Value        Reference Range Interpretation Comments

 

             MAGNESIUM (test code = MAG) 2.2 mg/dl    1.8-2.5      N            



THYROID REFLEX TO [id number] 19:00:00





             Test Item    Value        Reference Range Interpretation Comments

 

             THYROID REFLEX TO FT4 (test code 1.899 uIU/ml 0.450-5.330  N       

     



             = TSHREFLEX)                                        



BASIC METABOLIC [id number] 18:57:00





             Test Item    Value        Reference Range Interpretation Comments

 

             SODIUM (test code = 132 mmol/L   135-145      L            



             NA)                                                 

 

             POTASSIUM (test code 3.8 mmol/L   3.6-5.0      N            



             = K)                                                

 

             CHLORIDE (test code = 101 mmol/L   101-111      N            



             CL)                                                 

 

             CARBON DIOXIDE (test 15 mmol/L    21-31        L            



             code = CO2)                                         

 

             GLUCOSE (test code = 156 mg/dl           H            



             GLU)                                                

 

             BLOOD UREA NITROGEN 24 mg/dl     6-20         H            



             (test code = BUN)                                        

 

             GLOMERULAR FILTRATION 32           >60          L            The es

timated



             RATE (test code =                                        glomerular

 filtration



             GFR)                                                rate is compute

d



                                                                 usingpatient ra

ce, age



                                                                 (>18), sex, and

 serum



                                                                 creatinine. If 

anyof



                                                                 the needed data



                                                                 elements are mi

ssing



                                                                 the Laboratory 

cannot



                                                                 compute an ale

mation



                                                                 of the glomerul

ar



                                                                 filtration rate

.

 

             CREATININE (test code 1.73 mg/dL   0.44-1.03    H            



             = CREAT)                                            

 

             CALCIUM (test code = 9.2 mg/dL    8.5-10.5     N            



             CA)                                                 



LIVER FUNCTION [id number] 18:57:00





             Test Item    Value        Reference Range Interpretation Comments

 

             TOTAL PROTEIN (test code = PROT) 5.9 g/dL     6.7-8.2      L       

     

 

             ALBUMIN (test code = ALB) 3.4 g/dL     3.2-5.5      N            

 

             BILIRUBIN TOTAL (test code = BILT)  mg/dL       0.2-1.3            

       

 

             BILIRUBIN DIRECT (test code = BILD)  mg/dL       0.00-0.20         

        

 

             SGOT/AST (test code = AST)  U/L         10-42                     

 

             SGPT/ALT (test code = ALT)  U/L         10-60                     

 

             ALKALINE PHOSPHATASE (test code =  U/L                       

      



             ALKP)                                               



CREATINE KINASE (CK)2021 18:57:00





             Test Item    Value        Reference Range Interpretation Comments

 

             CREATINE KINASE (CK) (test code = CK)  U/L         0-210           

          



[id number] 18:57:00





             Test Item    Value        Reference Range Interpretation Comments

 

             MAGNESIUM (test code = MAG)  mg/dl       1.8-2.5                   



THYROID REFLEX TO [id number] 18:57:00





             Test Item    Value        Reference Range Interpretation Comments

 

             THYROID REFLEX TO FT4 (test code 1.899 uIU/ml 0.450-5.330  N       

     



             = TSHREFLEX)                                        



BASIC METABOLIC [id number] 18:55:00





             Test Item    Value        Reference Range Interpretation Comments

 

             SODIUM (test code = 132 mmol/L   135-145      L            



             NA)                                                 

 

             POTASSIUM (test code 3.8 mmol/L   3.6-5.0      N            



             = K)                                                

 

             CHLORIDE (test code = 101 mmol/L   101-111      N            



             CL)                                                 

 

             CARBON DIOXIDE (test 15 mmol/L    21-31        L            



             code = CO2)                                         

 

             GLUCOSE (test code = 156 mg/dl           H            



             GLU)                                                

 

             BLOOD UREA NITROGEN 24 mg/dl     6-20         H            



             (test code = BUN)                                        

 

             GLOMERULAR FILTRATION 32           >60          L            The es

timated



             RATE (test code =                                        glomerular

 filtration



             GFR)                                                rate is compute

d



                                                                 usingpatient ra

ce, age



                                                                 (>18), sex, and

 serum



                                                                 creatinine. If 

anyof



                                                                 the needed data



                                                                 elements are mi

ssing



                                                                 the Laboratory 

cannot



                                                                 compute an ale

mation



                                                                 of the glomerul

ar



                                                                 filtration rate

.

 

             CREATININE (test code 1.73 mg/dL   0.44-1.03    H            



             = CREAT)                                            

 

             CALCIUM (test code = 9.2 mg/dL    8.5-10.5     N            



             CA)                                                 



LIVER FUNCTION [id number] 18:55:00





             Test Item    Value        Reference Range Interpretation Comments

 

             TOTAL PROTEIN (test code = PROT)  g/dL        6.7-8.2              

     

 

             ALBUMIN (test code = ALB)  g/dL        3.2-5.5                   

 

             BILIRUBIN TOTAL (test code = BILT)  mg/dL       0.2-1.3            

       

 

             BILIRUBIN DIRECT (test code = BILD)  mg/dL       0.00-0.20         

        

 

             SGOT/AST (test code = AST)  U/L         10-42                     

 

             SGPT/ALT (test code = ALT)  U/L         10-60                     

 

             ALKALINE PHOSPHATASE (test code =  U/L                       

      



             ALKP)                                               



CREATINE KINASE (CK)2021 18:55:00





             Test Item    Value        Reference Range Interpretation Comments

 

             CREATINE KINASE (CK) (test code = CK)  U/L         0-210           

          



[id number] 18:55:00





             Test Item    Value        Reference Range Interpretation Comments

 

             MAGNESIUM (test code = MAG)  mg/dl       1.8-2.5                   



THYROID REFLEX TO [id number] 18:55:00





             Test Item    Value        Reference Range Interpretation Comments

 

             THYROID REFLEX TO FT4 (test code 1.899 uIU/ml 0.450-5.330  N       

     



             = TSHREFLEX)                                        



THYROID REFLEX TO [id number] 18:47:00





             Test Item    Value        Reference Range Interpretation Comments

 

             THYROID REFLEX TO FT4 (test code 1.856 uIU/ml 0.450-5.330          

     



             = TSHREFLEX)                                        



BASIC METABOLIC [id number] 18:32:00





             Test Item    Value        Reference Range Interpretation Comments

 

             SODIUM (test code = NA)  mmol/L      135-145                   

 

             POTASSIUM (test code = K)  mmol/L      3.6-5.0                   

 

             CHLORIDE (test code = CL)  mmol/L      101-111                   

 

             CARBON DIOXIDE (test code = CO2)  mmol/L      21-31                

     

 

             GLUCOSE (test code = GLU)  mg/dl                           

 

             BLOOD UREA NITROGEN (test code = BUN)  mg/dl       6-20            

          

 

             GLOMERULAR FILTRATION RATE (test code              >60             

          



             = GFR)                                              

 

             CREATININE (test code = CREAT)  mg/dL       0.44-1.03              

   

 

             CALCIUM (test code = CA)  mg/dL       8.5-10.5                  



LIVER FUNCTION [id number] 18:32:00





             Test Item    Value        Reference Range Interpretation Comments

 

             TOTAL PROTEIN (test code = PROT)  g/dL        6.7-8.2              

     

 

             ALBUMIN (test code = ALB)  g/dL        3.2-5.5                   

 

             BILIRUBIN TOTAL (test code = BILT)  mg/dL       0.2-1.3            

       

 

             BILIRUBIN DIRECT (test code = BILD)  mg/dL       0.00-0.20         

        

 

             SGOT/AST (test code = AST)  U/L         10-42                     

 

             SGPT/ALT (test code = ALT)  U/L         10-60                     

 

             ALKALINE PHOSPHATASE (test code =  U/L                       

      



             ALKP)                                               



CREATINE KINASE (CK)2021 18:32:00





             Test Item    Value        Reference Range Interpretation Comments

 

             CREATINE KINASE (CK) (test code = CK)  U/L         0-210           

          



[id number] 18:32:00





             Test Item    Value        Reference Range Interpretation Comments

 

             MAGNESIUM (test code = MAG)  mg/dl       1.8-2.5                   



THYROID REFLEX TO [id number] 18:32:00





             Test Item    Value        Reference Range Interpretation Comments

 

             THYROID REFLEX TO FT4 (test code 1.899 uIU/ml 0.450-5.330  N       

     



             = TSHREFLEX)                                        



[id number] 18:29:00





             Test Item    Value        Reference Range Interpretation Comments

 

             TROPONIN-I (test code = TROPI) 0.020 ng/mL  0.000-0.034  N         

   



LACTIC [id number] 18:26:00





             Test Item    Value        Reference Range Interpretation Comments

 

             LACTIC ACID (test 5.9 mmol/L   0.5-2.0      HH           Critical V

alue reported



             code = LACT)                                        toFirst Name: SARAH LOVE Last



                                                                 Name:VICKIEARPITA SOW READ



                                                                 BACK AND VERIFI

EDby



                                                                 N.LAB.NB1, on 0

21,



                                                                 @ 8145.



CALCIUM [id number] 18:16:00





             Test Item    Value        Reference Range Interpretation Comments

 

             CALCIUM IONIZED (test code = SUZANNE) 1.32 mmol/L  1.13-1.32    N      

      



LACTIC [id number] 13:55:00





             Test Item    Value        Reference Range Interpretation Comments

 

             LACTIC ACID (test 6.3 mmol/L   0.5-2.0      HH           Critical V

alue reported



             code = LACT)                                        toFirst Name:[facility]



                                                                 Last Name:DR.AL SPIVEY



                                                                 READ BACK AND



                                                                 RAFAby KATIA CERVANTESNB1,



                                                                 on 21, @ 

6255.



[id number] 13:28:00





             Test Item    Value        Reference Range Interpretation Comments

 

             LIPASE (test code = LIP) 28 IU/L      22-51        N            



[id number] 13:28:00





             Test Item    Value        Reference Range Interpretation Comments

 

             MAGNESIUM (test code = MAG) 2.0 mg/dl    1.8-2.5      N            



[id number] 13:28:00





             Test Item    Value        Reference Range Interpretation Comments

 

             ACETAMINOPHEN (test code = ACET) < 10.0 ug/ml 10.0-30.0    L       

     



[id number] 13:28:00





             Test Item    Value        Reference Range Interpretation Comments

 

             SALICYLATE (test code = SAL) < 4.0 mg/dl  0.0-30.0     N           

 



[id number] 13:28:00





             Test Item    Value        Reference Range Interpretation Comments

 

             ALCOHOL (test code = < 5 mg/dl                              Interpr

etive Data:il:



             ALC)                                                Ethanol Level T

o convert



                                                                 into legal unit

s, divide



                                                                 result by 1,000



Coronavirus 2019 nCoV [id number] 13:07:00





             Test Item    Value        Reference Range Interpretation Comments

 

             Coronavirus 2019 Negative     Negative                  This test h

as been



             nCoV Bedside (test                                        authorize

d by FDA under



             code = [id number])                                        an EUA 

for use



                                                                 byauthorized



                                                                 laboratories. T

his test



                                                                 has been author

ized only



                                                                 for the detecti

on



                                                                 ofnucleic acid 

from



                                                                 SARS-CoV-2, not

 for any



                                                                 other viruses



                                                                 orpathogens. Th

is test is



                                                                 only authorized

 for the



                                                                 duration of



                                                                 thedeclaration 

that



                                                                 circumstances e

xist



                                                                 justifying



                                                                 theauthorizatio

n of



                                                                 emergency use o

f in vitro



                                                                 diagnostic test

sfor the



                                                                 detection and/o

r



                                                                 diagnosis of CO

VID-19



                                                                 under [id number]

4(b)(1) of



                                                                 the Act, 21 [place]

.C



                                                                 360bbb-3(b)(1),

 unless



                                                                 theauthorizatio

n is



                                                                 terminated or r

evoked



                                                                 sooner. Specime

n Source:



                                                                 Nasopharyngeal 

(NP) Swab



                                                                 This test has b

een



                                                                 authorized by [employer] under



                                                                 an EUA for use



                                                                 byauthorized



                                                                 laboratories. T

his test



                                                                 has been author

ized only



                                                                 for the detecti

on



                                                                 ofnucleic acid 

(PCR) from



                                                                 SARS-CoV-2, not

 from any



                                                                 other viruses o

r



                                                                 pathogens. This

 test is



                                                                 only authorized

 for the



                                                                 duration of



                                                                 thedeclaration 

that



                                                                 circumstances e

xist



                                                                 justifying



                                                                 theauthorizatio

n of



                                                                 emergency use o

f in vitro



                                                                 diagnostic test

sfor the



                                                                 detection and/o

r



                                                                 diagnosis of CO

VID-19



                                                                 under [id number]

4(b)(1) of



                                                                 the Act, 21 [place].S

.C



                                                                 360bbb-3(b)(1),

 unless



                                                                 theauthorizatio

n is



                                                                 terminated or r

evoked



                                                                 sooner.



- CT ABD PELVIS [id number] 11:53:00
************************************************************[facility] : 1965 Sex: 
F************************************************************PatientName: 
GAIL VELÁZQUEZ Unit No: [id number] EXAMS: CPT: [id number] CT ABD PELVIS W/CONT 
61071 TECHNIQUE: CT scan of the abdomen and pelvis performed from the lung bases
through the lesser trochanter following the administration of 100 mL Isovue 300 
IV contrast. DLP: 539 mGy/cm. The study was performed with radiation dose 
optimization per ACR practice guidelines and 's recommendations 
which includes: automated exposure control, adjustment of the mA and/or KV 
according to patient size, and/or use of iterative reconstruction technique.  
COMPARISON: None CLINICAL HISTORY: Abdominal pain FINDINGS:Lung bases are clear.
Diffuse fatty infiltration of the liver seen. No enhancing hepatic mass identif
ied. The portal vein is patent. The spleen, pancreas, adrenal glands, and 
kidneys appear unremarkable. There is no free fluid or free air. No 
retroperitoneal mass or adenopathy identified. The aorta has a normal caliber. 
Bowel loops appear unremarkable. No focal bowel wall thickening or mechanical 
bowel obstruction. The appendix is not visualized. Please correlate with 
surgical history. The urinary bladder is nondistended but appears grossly within
normal  limits. No pelvic mass or adenopathy identified. No acute or aggressive 
bony lesion identified. Post surgical changes are seen from fusion of L4and L5 
with posterior instrumentation. Intraspinal neurostimulator noted. IMPRESSION: 
No acute findings identified. Diffuse fatty infiltration of the liver. ** 
Electronically Signed by Suresh Lin MD on2021 at 1153 ** Reported and 
signed by: Suresh Lin MD Name: GAIL VELÁZQUEZ  [facility] Phys: Floyd Sifuentes  Kasi Osborne : 1965 Age: 56 Sex: [place]
[id number] Acct No: [id number] Loc: N.ERS Exam Date: 2021 Status: REG ER PH: 
FAX: PAGE 1 Signed Report  (CONTINUED) Patient Name: GAIL VELÁZQUEZ Unit No: 
[id number] EXAMS: CPT: [id number] CT ABD PELVIS W/CONT 88746 (Continued) CC:  
Technologist: Teodora VIDAL CTDI: 10.89 DLP: 539.39 Trscr Dt/Tm: 2021
(1153) by:Mario Alberto Orig Print D/T: S: 2021 (1156) BATCH NO: N/A Name: 
GAIL VELÁZQUEZ [facility] Phys: Floyd Sifuentes  [place] 
[place] : 1965 Age: 56 Sex: [place] [id number]  Acct No: [id number] 
Loc: N.ERS Exam Date: 2021 Status: REG ER PH: FAX: PAGE 2 SignedReport
[id number] 11:52:00





             Test Item    Value        Reference Range Interpretation Comments

 

             LIPASE (test code = LIP) 28 IU/L      22-51        N            



[id number] 11:52:00





             Test Item    Value        Reference Range Interpretation Comments

 

             MAGNESIUM (test code = MAG) 2.0 mg/dl    1.8-2.5      N            



[id number] 11:52:00





             Test Item    Value        Reference Range Interpretation Comments

 

             ACETAMINOPHEN (test code = ACET)  ug/ml       10.0-30.0            

     



[id number] 11:52:00





             Test Item    Value        Reference Range Interpretation Comments

 

             SALICYLATE (test code = SAL) < 4.0 mg/dl  0.0-30.0     N           

 



[id number] 11:52:00





             Test Item    Value        Reference Range Interpretation Comments

 

             ALCOHOL (test code = < 5 mg/dl                              Interpr

etive Data:il:



             ALC)                                                Ethanol Level T

o convert



                                                                 into legal unit

s, divide



                                                                 result by 1,000



[id number] 11:37:00





             Test Item    Value        Reference Range Interpretation Comments

 

             LIPASE (test code = LIP) 28 IU/L      22-51        N            



[id number] 11:37:00





             Test Item    Value        Reference Range Interpretation Comments

 

             MAGNESIUM (test code = MAG) 2.0 mg/dl    1.8-2.5      N            



[id number] 11:37:00





             Test Item    Value        Reference Range Interpretation Comments

 

             ACETAMINOPHEN (test code = ACET)  ug/ml       10.0-30.0            

     



[id number] 11:37:00





             Test Item    Value        Reference Range Interpretation Comments

 

             SALICYLATE (test code = SAL)  mg/dl       0.0-30.0                 

 



[id number] 11:37:00





             Test Item    Value        Reference Range Interpretation Comments

 

             ALCOHOL (test code = < 5 mg/dl                              Interpr

etive Data:il:



             ALC)                                                Ethanol Level T

o convert



                                                                 into legal unit

s, divide



                                                                 result by 1,000



[id number] 11:09:00





             Test Item    Value        Reference Range Interpretation Comments

 

             LIPASE (test code = LIP) 28 IU/L      22-51        N            



[id number] 11:09:00





             Test Item    Value        Reference Range Interpretation Comments

 

             MAGNESIUM (test code = MAG)  mg/dl       1.8-2.5                   



[id number] 11:09:00





             Test Item    Value        Reference Range Interpretation Comments

 

             ACETAMINOPHEN (test code = ACET)  ug/ml       10.0-30.0            

     



[id number] 11:09:00





             Test Item    Value        Reference Range Interpretation Comments

 

             SALICYLATE (test code = SAL)  mg/dl       0.0-30.0                 

 



[id number] 11:09:00





             Test Item    Value        Reference Range Interpretation Comments

 

             ALCOHOL (test code = ALC)  mg/dl                                 



LACTIC [id number] 11:08:00





             Test Item    Value        Reference Range Interpretation Comments

 

             LACTIC ACID (test 8.8 mmol/L   0.5-2.0      HH           Critical V

alue reported



             code = LACT)                                        toFirst Name:FLORINDA MENSAH



                                                                 Last Name:JAYY

RESULTS



                                                                 READ BACK AND



                                                                 VERIFIEDby KATIA GUAJARDO,



                                                                 on 21, @ 

1108.



DRUGS OF ABUSE SCREEN [id number] 08:35:00





             Test Item    Value        Reference Range Interpretation Comments

 

             UR COCAINE (test code NEGATIVE     NEGATIVE                  This i

s a toxicology



             = COCAU)                                            qualitative scr

eening



                                                                 test only, whic

hmay



                                                                 detect parent c

ompound or



                                                                 metabolite or



                                                                 relatedsubstanc

e. If



                                                                 confirmatory te

sting is



                                                                 desired, please

request



                                                                 drug screen con

firmation.



                                                                 These results a

re



                                                                 unconfirmed and

 should be



                                                                 used only for m

edical



                                                                 purposes. Cut-o

ff



                                                                 concentration f

or Cocaine



                                                                 is 300 ng/mLRec

ommended



                                                                 screening cut-o

ff



                                                                 concentrations 

by



                                                                 [facility]

use and



                                                                 [facility].

 

             UR CANABINOIDS (test NEGATIVE     NEGATIVE                  This is

 a toxicology



             code = CANU)                                        qualitative scr

eening



                                                                 test only which

may detect



                                                                 parent compound

 or



                                                                 metabolite or



                                                                 relatedsubstanc

e. If



                                                                 confirmatory te

sting is



                                                                 desired, please

request



                                                                 drug screen con

firmation.



                                                                 These results



                                                                 areunconfirmed 

and should



                                                                 be used only fo

r medical



                                                                 purposes. Cut-o

ff



                                                                 concentration f

or THC is



                                                                 50 ng/mLRecomme

nded



                                                                 screening cut-o

ff



                                                                 concentrations 

by



                                                                 theSubstance Ab

use and



                                                                 [facility]

[facility]



                                                                 [facility].

 

             UR AMPHETAMINE (test NEGATIVE     NEGATIVE                  The ing

estion of natural



             code = AMPHU)                                        herbal and sigifredo

nt



                                                                 productscontain

ing



                                                                 Ephedra/Ephedra

-Metabolit



                                                                 es can produce 

in



                                                                 urineone or mor

e



                                                                 substances capa

ble of



                                                                 cross-reacting



                                                                 withAmphetamine

/Methamphe



                                                                 tamine immunoas

says. This



                                                                 testprovides a



                                                                 preliminary res

ult only.



                                                                 A more



                                                                 specificalterna

tive



                                                                 chemical method

 must be



                                                                 used to obtain 

aconfirmed



                                                                 analytical resu

lt. This



                                                                 is a toxicology



                                                                 qualitative scr

eening



                                                                 test only which

may detect



                                                                 parent compund 

or



                                                                 metabolite or



                                                                 relatedsubstanc

e. If



                                                                 confirmatory te

sting is



                                                                 desired, please

request



                                                                 drug screen con

firmation.



                                                                 These results



                                                                 areunconfirmed 

and should



                                                                 be used only fo

r medical



                                                                 purposes. Cut-o

ff



                                                                 concentration f

or



                                                                 Amphetamines is

 1000



                                                                 ng/mLRecommende

d



                                                                 screening cut-o

ff



                                                                 concentrations 

by



                                                                 thebstance Ab

use and



                                                                 [facility]

[facility]



                                                                 [facility].

 

             UR BARBITURATE (test NEGATIVE     NEGATIVE                  This is

 a toxicology



             code = BARBQLU)                                        qualitative 

screening



                                                                 test only which

may detect



                                                                 parent compund 

or



                                                                 metabolite or



                                                                 relatedsubstanc

e. If



                                                                 confirmatory te

sting is



                                                                 desired, please

request



                                                                 drug screen con

firmation.



                                                                 These results



                                                                 areunconfirmed 

and should



                                                                 be used only fo

r medical



                                                                 purposes. Cut-o

ff



                                                                 concentration f

or



                                                                 Barbiturates is

 200



                                                                 ng/mLRecommende

d



                                                                 screening cut-o

ff



                                                                 concentrations 

by



                                                                 theSubstance Ab

use and



                                                                 [facility]

[facility]



                                                                 [facility].

 

             UR BENZODIAZEPINE NEGATIVE     NEGATIVE                  This is a 

toxicology



             (test code = BENZU)                                        qualitat

madison screening



                                                                 test only which

may detect



                                                                 parent compound

 or



                                                                 metabolite or



                                                                 relatedsubstanc

e. If



                                                                 confirmatory te

sting is



                                                                 desired, please

request



                                                                 drug screen con

firmation.



                                                                 These results



                                                                 areunconfirmed 

and should



                                                                 be used only fo

r medical



                                                                 purposes. Cut-o

ff



                                                                 concentration f

or



                                                                 Benzodiazepines

 is 200



                                                                 ng/mLRecommende

d



                                                                 screening cut-o

ff



                                                                 concentrations 

by



                                                                 [facility]

use and



                                                                 [facility]

ervices



                                                                 Administration.

 

             UR OPIATES QUAL (test POSITIVE     NEGATIVE     A            This i

s a toxicology



             code = OPIAQLU)                                        qualitative 

screening



                                                                 test only which

may detect



                                                                 parent compound

 or



                                                                 metabolite or



                                                                 relatedsubstanc

e. If



                                                                 confirmatory te

sting is



                                                                 desired, please

request



                                                                 drug screen con

firmation.



                                                                 These results



                                                                 areunconfirmed 

and should



                                                                 be used only fo

r medical



                                                                 purposes. Cut-o

ff



                                                                 concentration f

or Opiates



                                                                 is 300 ng/mLRec

ommended



                                                                 screening cut-o

ff



                                                                 concentrations 

by



                                                                 thebstance Ab

use and



                                                                 [facility]

ervices



                                                                 Administration.

 

             UR PHENCYCLIDINE NEGATIVE     NEGATIVE                  This is a t

oxicology



             (PCP) (test code =                                        qualitati

ve screening



             PHENCU)                                             test only which

may detect



                                                                 parent compund 

or



                                                                 metabolite or



                                                                 relatedsubstanc

e. If



                                                                 confirmatory te

sting is



                                                                 desired, please

request



                                                                 drug screen con

firmation.



                                                                 These results



                                                                 areunconfirmed 

and should



                                                                 be used only fo

r medical



                                                                 purposes. Cut-o

ff



                                                                 concentration f

or PCP is



                                                                 25 ng/mLRecomme

nded



                                                                 screening cut-o

ff



                                                                 concentrations 

by



                                                                 [employer]

use and



                                                                 [facility].



COMPREHENSIVE METABOLIC [id number] 08:04:00





             Test Item    Value        Reference Range Interpretation Comments

 

             SODIUM (test code = 133 mmol/L   135-145      L            



             NA)                                                 

 

             POTASSIUM (test code 3.2 mmol/L   3.6-5.0      L            



             = K)                                                

 

             CHLORIDE (test code = 97 mmol/L    101-111      L            



             CL)                                                 

 

             CARBON DIOXIDE (test 17 mmol/L    21-31        L            



             code = CO2)                                         

 

             GLUCOSE (test code = 156 mg/dl           H            



             GLU)                                                

 

             BLOOD UREA NITROGEN 21 mg/dl     6-20         H            



             (test code = BUN)                                        

 

             GLOMERULAR FILTRATION 34           >60          L            The es

timated



             RATE (test code =                                        glomerular

 filtration



             GFR)                                                rate is compute

d



                                                                 usingpatient ra

ce, age



                                                                 (>18), sex, and

 serum



                                                                 creatinine. If 

anyof



                                                                 the needed data



                                                                 elements are mi

ssing



                                                                 the Laboratory 

cannot



                                                                 compute an ale

mation



                                                                 of the glomerul

ar



                                                                 filtration rate

.

 

             CREATININE (test code 1.64 mg/dL   0.44-1.03    H            



             = CREAT)                                            

 

             TOTAL PROTEIN (test 6.9 g/dL     6.7-8.2      N            



             code = PROT)                                        

 

             ALBUMIN (test code = 4.2 g/dL     3.2-5.5      N            



             ALB)                                                

 

             CALCIUM (test code = 9.5 mg/dL    8.5-10.5     N            



             CA)                                                 

 

             BILIRUBIN TOTAL (test 0.40 mg/dL   0.2-1.3      N            



             code = BILT)                                        

 

             SGOT/AST (test code = 29 U/L       10-42        N            



             AST)                                                

 

             SGPT/ALT (test code = 41 U/L       10-60        N            



             ALT)                                                

 

             ALKALINE PHOSPHATASE 87 U/L              N            



             (test code = ALKP)                                        



[id number] 08:04:00





             Test Item    Value        Reference Range Interpretation Comments

 

             TROPONIN-I (test code = TROPI) <0.020 ng/mL 0.000-0.034  N         

   



COMPREHENSIVE METABOLIC [id number] 08:02:00





             Test Item    Value        Reference Range Interpretation Comments

 

             SODIUM (test code = 133 mmol/L   135-145      L            



             NA)                                                 

 

             POTASSIUM (test code 3.2 mmol/L   3.6-5.0      L            



             = K)                                                

 

             CHLORIDE (test code = 97 mmol/L    101-111      L            



             CL)                                                 

 

             CARBON DIOXIDE (test 17 mmol/L    21-31        L            



             code = CO2)                                         

 

             GLUCOSE (test code = 156 mg/dl           H            



             GLU)                                                

 

             BLOOD UREA NITROGEN 21 mg/dl     6-20         H            



             (test code = BUN)                                        

 

             GLOMERULAR FILTRATION 34           >60          L            The es

timated



             RATE (test code =                                        glomerular

 filtration



             GFR)                                                rate is compute

d



                                                                 usingpatient ra

ce, age



                                                                 (>18), sex, and

 serum



                                                                 creatinine. If 

anyof



                                                                 the needed data



                                                                 elements are mi

ssing



                                                                 the Laboratory 

cannot



                                                                 compute an ale

mation



                                                                 of the glomerul

ar



                                                                 filtration rate

.

 

             CREATININE (test code 1.64 mg/dL   0.44-1.03    H            



             = CREAT)                                            

 

             TOTAL PROTEIN (test 6.9 g/dL     6.7-8.2      N            



             code = PROT)                                        

 

             ALBUMIN (test code = 4.2 g/dL     3.2-5.5      N            



             ALB)                                                

 

             CALCIUM (test code = 9.5 mg/dL    8.5-10.5     N            



             CA)                                                 

 

             BILIRUBIN TOTAL (test  mg/dL       0.2-1.3                   



             code = BILT)                                        

 

             SGOT/AST (test code =  U/L         10-42                     



             AST)                                                

 

             SGPT/ALT (test code =  U/L         10-60                     



             ALT)                                                

 

             ALKALINE PHOSPHATASE  U/L                             



             (test code = ALKP)                                        



COMPREHENSIVE METABOLIC [id number] 07:59:00





             Test Item    Value        Reference Range Interpretation Comments

 

             SODIUM (test code = 133 mmol/L   135-145      L            



             NA)                                                 

 

             POTASSIUM (test code 3.2 mmol/L   3.6-5.0      L            



             = K)                                                

 

             CHLORIDE (test code = 97 mmol/L    101-111      L            



             CL)                                                 

 

             CARBON DIOXIDE (test 17 mmol/L    21-31        L            



             code = CO2)                                         

 

             GLUCOSE (test code = 156 mg/dl           H            



             GLU)                                                

 

             BLOOD UREA NITROGEN 21 mg/dl     6-20         H            



             (test code = BUN)                                        

 

             GLOMERULAR FILTRATION 34           >60          L            The es

timated



             RATE (test code =                                        glomerular

 filtration



             GFR)                                                rate is compute

d



                                                                 usingpatient ra

ce, age



                                                                 (>18), sex, and

 serum



                                                                 creatinine. If 

anyof



                                                                 the needed data



                                                                 elements are mi

ssing



                                                                 the Laboratory 

cannot



                                                                 compute an ale

mation



                                                                 of the glomerul

ar



                                                                 filtration rate

.

 

             CREATININE (test code 1.64 mg/dL   0.44-1.03    H            



             = CREAT)                                            

 

             TOTAL PROTEIN (test  g/dL        6.7-8.2                   



             code = PROT)                                        

 

             ALBUMIN (test code =  g/dL        3.2-5.5                   



             ALB)                                                

 

             CALCIUM (test code = 9.5 mg/dL    8.5-10.5     N            



             CA)                                                 

 

             BILIRUBIN TOTAL (test  mg/dL       0.2-1.3                   



             code = BILT)                                        

 

             SGOT/AST (test code =  U/L         10-42                     



             AST)                                                

 

             SGPT/ALT (test code =  U/L         10-60                     



             ALT)                                                

 

             ALKALINE PHOSPHATASE  U/L                             



             (test code = ALKP)                                        



URINALYSIS [id number] 07:58:00





             Test Item    Value        Reference Range Interpretation Comments

 

             UA COLOR (test code = Khushbu        YELLOW                    



             COLU)                                               

 

             UA APPEARANCE (test Cloudy       CLEAR                     



             code = APPU)                                        

 

             UA GLUCOSE DIPSTICK 1+           NEGATIVE                  



             (test code = DGLUU)                                        

 

             UA BILIRUBIN DIPSTICK NEGATIVE     NEGATIVE                  



             (test code = BILU)                                        

 

             UA KETONE DIPSTICK NEGATIVE     NEGATIVE                  



             (test code = KETU)                                        

 

             UA SPECIFIC GRAVITY 1.020        1.001-1.030               



             (test code = SGU)                                        

 

             UA BLOOD DIPSTICK NEGATIVE     NEGATIVE                  



             (test code = CASSI)                                        

 

             UA PH DIPSTICK (test 5.0          5.0-9.0                   



             code = VANNESSA)                                         

 

             UA PROTEIN DIPSTICK 2+           NEGATIVE     A            



             (test code = PROU)                                        

 

             UA UROBILINOGEN 2.0          See_Comment  A             [Automated 

message]



             DIPSTICK (test code =                                        The sy

stem which



             URO)                                                generated this 

result



                                                                 transmitted ref

erence



                                                                 range: <=1.0. T

he



                                                                 reference range

 was



                                                                 not used to int

erpret



                                                                 this result as



                                                                 normal/abnormal

.

 

             UA NITRITE DIPSTICK NEGATIVE     NEGATIVE                  



             (test code = TERRI)                                        

 

             UA ASCORBIC ACID NEGATIVE                               



             DIPSTICK (test code =                                        



             AAU)                                                

 

             UA LEUKOCYTE ESTERASE 2+           NEGATIVE     A            



             DIPSTICK (test code =                                        



             LEUU)                                               

 

             UA WBC (test code = 11-20 /HPF   0-5                       



             WBCU)                                               

 

             UA RBC (test code = 0-5 /HPF     0-5                       



             RBCU)                                               

 

             UA EPITHELIAL CELLS FEW /LPF     NONE-FEW                  



             (test code = EPIU)                                        

 

             UA BACTERIA (test code 1+ /HPF      NONE SEEN    A            



             = BACU)                                             

 

             UA HYALINE CAST (test >20 /LPF     0-1          A            



             code = HYALU)                                        



CBC W/AUTO [id number] 07:46:00





             Test Item    Value        Reference Range Interpretation Comments

 

             WHITE BLOOD CELL (test code = 21.6 x10 3/uL 3.2-11.5     H         

   



             WBC)                                                

 

             RED BLOOD CELL (test code = 5.17 x10(6)/m 3.70-5.10    H           

 



             RBC)                                                

 

             HEMOGLOBIN (test code = HGB) 14.3 g/dL    12.0-15.0    N           

 

 

             HEMATOCRIT (test code = HCT) 43.1 %       35.7-44.8    N           

 

 

             MEAN CELL VOLUME (test code = 83 fL               N          

  



             MCV)                                                

 

             MEAN CELL HGB (test code = MCH) 27.7 pg      26.2-33.8    N        

    

 

             MEAN CELL HGB CONCENTRATION 33.2 g/dL    30.0-34.0    N            



             (test code = MCHC)                                        

 

             RED CELL DISTRIBUTION WIDTH 12.2 %       11.3-14.5    N            



             (test code = RDW)                                        

 

             PLATELET COUNT (test code = 428 x10 3/uL 130-408      H            



             PLT)                                                

 

             MEAN PLATELET VOLUME (test code 9.0 fL       8.6-12.6     N        

    



             = MPV)                                              

 

             NEUTROPHIL % (test code = NT%) 79.5 %       40.0-70.0    H         

   

 

             IMMATURE GRANULOCYTE % (test 1.9 %        0.0-2.0      N           

 



             code = IG%)                                         

 

             LYMPHOCYTE % (test code = LY%) 13.2 %       20-40        L         

   

 

             MONOCYTE % (test code = MO%) 4.6 %        1-10         N           

 

 

             EOSINOPHIL % (test code = EO%) 0.4 %        0.0-5.0      N         

   

 

             BASOPHIL % (test code = BA%) 0.4 %        0.0-1.0      N           

 

 

             NUCLEATED RBC % (test code = 0.0 %        0.0-0.9      N           

 



             NRBC%)                                              

 

             NEUTROPHIL # (test code = NT#) 17.2 x10 3/uL 1.6-7.2      H        

    

 

             LYMPHOCYTE # (test code = LY#) 2.85 x10 3/uL 1.1-2.7      H        

    

 

             MONOCYTE # (test code = MO#) 1.0 x10 3/uL 0.3-0.8      H           

 

 

             EOSINOPHIL # (test code = EO#) 0.1 x10 3/uL 0.0-0.5      N         

   

 

             BASOPHIL # (test code = BA#) 0.1 x10 3/uL 0.0-0.1      N           

 



CBC W/AUTO [id number] 07:44:00





             Test Item    Value        Reference Range Interpretation Comments

 

             WHITE BLOOD CELL (test code = 21.6 x10 3/uL 3.2-11.5     H         

   



             WBC)                                                

 

             RED BLOOD CELL (test code = 5.17 x10(6)/m 3.70-5.10    H           

 



             RBC)                                                

 

             HEMOGLOBIN (test code = HGB) 14.3 g/dL    12.0-15.0    N           

 

 

             HEMATOCRIT (test code = HCT) 43.1 %       35.7-44.8    N           

 

 

             MEAN CELL VOLUME (test code = 83 fL               N          

  



             MCV)                                                

 

             MEAN CELL HGB (test code = MCH) 27.7 pg      26.2-33.8    N        

    

 

             MEAN CELL HGB CONCENTRATION 33.2 g/dL    30.0-34.0    N            



             (test code = MCHC)                                        

 

             RED CELL DISTRIBUTION WIDTH  %           11.3-14.5                 



             (test code = RDW)                                        

 

             PLATELET COUNT (test code =  x10 3/uL    130-408                   



             PLT)                                                

 

             MEAN PLATELET VOLUME (test code 9.0 fL       8.6-12.6     N        

    



             = MPV)                                              

 

             NEUTROPHIL % (test code = NT%)  %           40.0-70.0              

   

 

             LYMPHOCYTE % (test code = LY%)  %           20-40                  

   

 

             MONOCYTE % (test code = MO%)  %           1-10                     

 

 

             EOSINOPHIL % (test code = EO%)  %           0.0-5.0                

   

 

             BASOPHIL % (test code = BA%)  %           0.0-1.0                  

 

 

             NUCLEATED RBC % (test code =  %           0.0-0.9                  

 



             NRBC%)                                              

 

             NEUTROPHIL # (test code = NT#)  x10 3/uL    1.6-7.2                

   

 

             LYMPHOCYTE # (test code = LY#)  x10 3/uL    1.1-2.7                

   

 

             MONOCYTE # (test code = MO#)  x10 3/uL    0.3-0.8                  

 

 

             EOSINOPHIL # (test code = EO#)  x10 3/uL    0.0-0.5                

   



- XR CHEST 1 -25-23 06:44:00
********************************************************[facility] : 1965 Sex: 
F************************************************************PatientName: 
GAIL VELÁZQUEZ Unit No: [id number] EXAMS: CPT: [id number] XR CHEST 1 V 42460 
PORTABLE CHEST, 2021. Comparison: None. CLINICAL: Chest pain. COMMENT: The 
heart, mediastinum, hilar regions and pulmonary vasculature appear within normal
limits. The lungs are free of active disease. The bony thorax is intact. 
IMPRESSION: No evidence to suggest active cardiopulmonary disease. ** 
Electronically Signed by Fortino Bagley MD on 2021 at 0644 ** Reported and 
signed by: Fortino Bagley MD CC: America Carmichael NP  Technologist: Peg Sommer Time: DAP (Gy m2): Air Kerma (mGy): Trscr Dt/Tm: 2021(0644) 
by:[email or web address]  Orig Print D/T: S: 2021 (0647) BATCH NO: N/A Name: 
GAIL VELÁZQUEZ [facility]  Phys: America Greene  [place] 
[place] : 1965 Age: 56 Sex: F [place] [id number] Acct No: [id number] 
Loc: N.ERS  Exam Date: 2021 Status: PRE ER PH: FAX: PAGE 1 Signed Report [FreeTextEntry1] : Mr. Irizarry ia a  75-year-old gentleman who sustained a work related accident with injuries this: Lumbosacral region pain.  Ocular features at this time.  He received 50% relief from caudal epidural steroid injection earlier this month and repeat caudal epidural x1 with MAC has been advised.  In addition referral to Dr. Henderson to discuss the numbness in his lower extremities and bladder issues.  Oxycodone 15 mg p.o. t.i.d., cyclobenzaprine, and hydroxyzine will be refilled.  He will follow up in 4 weeks.\par \par I explained the risk of addiction, tolerance and withdrawals. UDS will be done randomly and a drug agreement was signed.\par \par I advised the patient they must keep their medication under a lock and key, or in a safe place away from children or other individuals. This medication given is solely for the patient and under no circumstances to be shared. Patient verbalized this and is in agreement with the aforementioned. I explained the risk of addiction, tolerance, and withdrawals. UDS will be done randomly and a drug agreement was signed.\par \par Patient had a MRI that shows a radicular component along with pain referred into the lower extremity. Patient has trialed rehab (Home exercise, physical therapy or chiropractic care) and medications I will schedule a Caudal 1-3 depending on effectiveness.\par \par The patient has severe anxiety of procedures that necessitates monitored anesthesia care (MAC). The procedure performed will be close to major nerves, arteries, and spinal cord and/or joint structures. Due to the proximity of these structures, we need the patient to be still during the procedure. With the help of MAC, this will be safely achieved and decrease the risk of any complications.\par \par Risk, benefits, pros and cons of procedure were explained to the patient using models and diagrams and their questions were answered.\par \par Thank you for allowing me to assist in the management of this patient. \par \par \par  Best Regards, \par \par \par  Kathie Phillips M.D., FAAPMR\par \par \par  Diplomate, American Board of Physical Medicine and Rehabilitation\par  Diplomate, American Board of Pain Medicine \par  Diplomate, American Board of Pain Management\par \par \par